# Patient Record
Sex: MALE | Race: BLACK OR AFRICAN AMERICAN | NOT HISPANIC OR LATINO | Employment: PART TIME | URBAN - METROPOLITAN AREA
[De-identification: names, ages, dates, MRNs, and addresses within clinical notes are randomized per-mention and may not be internally consistent; named-entity substitution may affect disease eponyms.]

---

## 2017-05-26 ENCOUNTER — OFFICE VISIT (OUTPATIENT)
Dept: FAMILY MEDICINE | Facility: CLINIC | Age: 27
End: 2017-05-26
Payer: COMMERCIAL

## 2017-05-26 ENCOUNTER — LAB VISIT (OUTPATIENT)
Dept: LAB | Facility: HOSPITAL | Age: 27
End: 2017-05-26
Attending: INTERNAL MEDICINE
Payer: COMMERCIAL

## 2017-05-26 VITALS
WEIGHT: 169.31 LBS | DIASTOLIC BLOOD PRESSURE: 84 MMHG | SYSTOLIC BLOOD PRESSURE: 124 MMHG | BODY MASS INDEX: 20.62 KG/M2 | TEMPERATURE: 98 F | HEART RATE: 78 BPM | HEIGHT: 76 IN | OXYGEN SATURATION: 99 %

## 2017-05-26 DIAGNOSIS — Z20.2 EXPOSURE TO CHLAMYDIA: ICD-10-CM

## 2017-05-26 DIAGNOSIS — Z11.3 SCREEN FOR STD (SEXUALLY TRANSMITTED DISEASE): Primary | ICD-10-CM

## 2017-05-26 DIAGNOSIS — J30.1 NON-SEASONAL ALLERGIC RHINITIS DUE TO POLLEN: ICD-10-CM

## 2017-05-26 DIAGNOSIS — G44.209 ACUTE NON INTRACTABLE TENSION-TYPE HEADACHE: ICD-10-CM

## 2017-05-26 DIAGNOSIS — Z11.3 SCREEN FOR STD (SEXUALLY TRANSMITTED DISEASE): ICD-10-CM

## 2017-05-26 LAB
C TRACH DNA SPEC QL NAA+PROBE: DETECTED
N GONORRHOEA DNA SPEC QL NAA+PROBE: NOT DETECTED

## 2017-05-26 PROCEDURE — 36415 COLL VENOUS BLD VENIPUNCTURE: CPT | Mod: PO

## 2017-05-26 PROCEDURE — 86592 SYPHILIS TEST NON-TREP QUAL: CPT

## 2017-05-26 PROCEDURE — 99213 OFFICE O/P EST LOW 20 MIN: CPT | Mod: S$GLB,,, | Performed by: INTERNAL MEDICINE

## 2017-05-26 PROCEDURE — 87340 HEPATITIS B SURFACE AG IA: CPT

## 2017-05-26 PROCEDURE — 87591 N.GONORRHOEAE DNA AMP PROB: CPT

## 2017-05-26 PROCEDURE — 86706 HEP B SURFACE ANTIBODY: CPT

## 2017-05-26 PROCEDURE — 86703 HIV-1/HIV-2 1 RESULT ANTBDY: CPT

## 2017-05-26 PROCEDURE — 99999 PR PBB SHADOW E&M-EST. PATIENT-LVL III: CPT | Mod: PBBFAC,,, | Performed by: INTERNAL MEDICINE

## 2017-05-26 RX ORDER — AZITHROMYCIN 500 MG/1
1000 TABLET, FILM COATED ORAL ONCE
Qty: 2 TABLET | Refills: 0 | Status: SHIPPED | OUTPATIENT
Start: 2017-05-26 | End: 2017-05-26

## 2017-05-26 NOTE — PROGRESS NOTES
Assessment & Plan  Acute non intractable tension-type headache-with associated right-sided neck tenderness on palpation.  Very low suspicion for intracranial process.  Low suspicion for lab derangements.  Conservative therapy, cool packs, range of motion exercises.  He denies being under a lot of stress.  I did discuss with him that sometimes marijuana use can cause headaches and if it persists I have recommended that he cut down on the marijuana use.    Exposure to chlamydia-girlfriend reportedly tested positive, I will treat him with Zithromax.  Abstain from sex until both are treated.  -     azithromycin (ZITHROMAX) 500 MG tablet; Take 2 tablets (1,000 mg total) by mouth once.  Dispense: 2 tablet; Refill: 0    Screen for STD (sexually transmitted disease)-check HIV, hep B, syphilis, chlamydia and gonorrhea.  If negative for hep B vaccine, I would vaccinate.  -     C. trachomatis/N. gonorrhoeae by AMP DNA Urine  -     Hepatitis B surface antibody; Future; Expected date: 05/26/2017  -     Hepatitis B surface antigen; Future; Expected date: 05/26/2017  -     HIV-1 and HIV-2 antibodies; Future; Expected date: 05/26/2017  -     RPR; Future; Expected date: 05/26/2017    Non-seasonal allergic rhinitis due to pollen    There are no discontinued medications.    Follow-up: No Follow-up on file.  Call with results - [phone]      =================================================================      Chief Complaint   Patient presents with    [facility]       [facility]  Everton is a 27 y.o. male, last appointment with this clinic was 12/23/2016.     itching x 2 weeks, on the penis on the meatus.  No redness no discharge.  No previous instance of this.  Testicles are normal. Hx of testicular torsion age 18 s/p surgery.  Currently sexually active, stable partner x 7 years, no others.  She was dx'd with chl and treated.  No previous STD.  Condoms, not always. Last sexual activity - 6 weeks ago.      On work days - gets headache  in a single spot, with pressure sensation and hard for him to bend over.  Neck pain in the past 3 days but the headache precedes the neck pain.  May be triggered by lights he thinks; stabbing pain sensation on the crown of the head, can move around different episodes; no vision changes.  No scotomas no scintillations.  No nausea.  Steady pain.  If he sits or takes a tylenol it might go away after about an hour.   Has been going on for about a month. Works 5 days a week; does not seem to occur on off days.  Not daily; estimates 1-2 times a week occurrence.  No relationship to time of day, whether he's eaten.  Drinks a lot of cola.  Works aas maintenance.  Not demanding job no physically rigorous.  Stays hydrated.    May get headaches, in the past, milder.     Smoker - THC in cigars;  TID.      Patient Care Team:  Primary Doctor No as PCP - General    There are no active problems to display for this patient.      PAST MEDICAL HISTORY:  History reviewed. No pertinent past medical history.    PAST SURGICAL HISTORY:  Past Surgical History:   Procedure Laterality Date    testicle surgery for torsion       Family History   Problem Relation Age of Onset    Asthma Mother     Diabetes Father     No Known Problems Sister     No Known Problems Brother     No Known Problems Brother     No Known Problems Brother     No Known Problems Brother     No Known Problems Son     No Known Problems Son     Allergic rhinitis Daughter          SOCIAL HISTORY:  Social History     Social History    Marital status: Single     Spouse name: N/A    Number of children: N/A    Years of education: N/A     Occupational History    maintenance [employer]     Social History Main Topics    Smoking status: Current Some Day Smoker     Types: Cigars    Smokeless tobacco: Never Used    Alcohol use Not on file    Drug use:      Types: Marijuana    Sexual activity: Not on file     Other Topics Concern    Not on file     Social History Narrative  "   No narrative on file       ALLERGIES AND MEDICATIONS: updated and reviewed.  Review of patient's allergies indicates:  No Known Allergies  Current Outpatient Prescriptions   Medication Sig Dispense Refill    ibuprofen (ADVIL,MOTRIN) 100 mg/5 mL suspension Take by mouth every 6 (six) hours as needed for Temperature greater than.       No current facility-administered medications for this visit.        Review of Systems   All other systems reviewed and are negative.      Physical Exam   Vitals:    05/26/17 1324   BP: 124/84   Pulse: 78   Temp: 98 °F (36.7 °C)   SpO2: 99%   Weight: 76.8 kg (169 lb 5 oz)   Height: 6' 4" (1.93 m)    Body mass index is 20.61 kg/m².  Weight: 76.8 kg (169 lb 5 oz)   Height: 6' 4" (193 cm)     Physical Exam   Constitutional: He is oriented to person, place, and time. He appears well-developed and well-nourished.   HENT:   Right Ear: Tympanic membrane and ear canal normal.   Left Ear: Tympanic membrane and ear canal normal.   Mouth/Throat: No oral lesions. No oropharyngeal exudate or posterior oropharyngeal erythema.   Eyes: EOM are normal.   Fundi disc margins sharp   Neck: Neck supple.   Cardiovascular: Normal rate, regular rhythm and normal heart sounds.    Pulmonary/Chest: Effort normal and breath sounds normal. He has no wheezes.   Genitourinary:   Genitourinary Comments: Uncircumcised, easily retractable foreskin, no lesions, no discharge. Testes descended bilaterally, no masses nontender.   Lymphadenopathy:     He has no cervical adenopathy.   Neurological: He is alert and oriented to person, place, and time.   Skin: Skin is warm and dry.   Psychiatric: He has a normal mood and affect. His behavior is normal.     "

## 2017-05-29 ENCOUNTER — TELEPHONE (OUTPATIENT)
Dept: FAMILY MEDICINE | Facility: CLINIC | Age: 27
End: 2017-05-29

## 2017-05-29 LAB
HBV SURFACE AB SER-ACNC: POSITIVE M[IU]/ML
HBV SURFACE AG SERPL QL IA: NEGATIVE
HIV 1+2 AB+HIV1 P24 AG SERPL QL IA: NEGATIVE
RPR SER QL: NORMAL

## 2017-05-29 NOTE — TELEPHONE ENCOUNTER
Please call pt - chlamydia positive (his girlfriend was positive so this is not a surprise).  Rest of STD tests were negative.    He should have taken the antibiotic.
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
## 2017-10-27 ENCOUNTER — OFFICE VISIT (OUTPATIENT)
Dept: FAMILY MEDICINE | Facility: CLINIC | Age: 27
End: 2017-10-27
Payer: COMMERCIAL

## 2017-10-27 VITALS
WEIGHT: 167.88 LBS | SYSTOLIC BLOOD PRESSURE: 110 MMHG | TEMPERATURE: 98 F | DIASTOLIC BLOOD PRESSURE: 70 MMHG | HEART RATE: 72 BPM | HEIGHT: 77 IN | BODY MASS INDEX: 19.82 KG/M2

## 2017-10-27 DIAGNOSIS — Z00.00 NORMAL PHYSICAL EXAM: Primary | ICD-10-CM

## 2017-10-27 DIAGNOSIS — S39.012A LOW BACK STRAIN, INITIAL ENCOUNTER: ICD-10-CM

## 2017-10-27 DIAGNOSIS — G89.29 CHRONIC PAIN OF RIGHT KNEE: ICD-10-CM

## 2017-10-27 DIAGNOSIS — Z23 NEED FOR DIPHTHERIA-TETANUS-PERTUSSIS (TDAP) VACCINE, ADULT/ADOLESCENT: ICD-10-CM

## 2017-10-27 DIAGNOSIS — M25.561 CHRONIC PAIN OF RIGHT KNEE: ICD-10-CM

## 2017-10-27 DIAGNOSIS — Z87.828 HISTORY OF GUNSHOT WOUND: ICD-10-CM

## 2017-10-27 DIAGNOSIS — B35.3 TINEA PEDIS, UNSPECIFIED LATERALITY: ICD-10-CM

## 2017-10-27 PROCEDURE — 90471 IMMUNIZATION ADMIN: CPT | Mod: S$GLB,,, | Performed by: INTERNAL MEDICINE

## 2017-10-27 PROCEDURE — 99395 PREV VISIT EST AGE 18-39: CPT | Mod: 25,S$GLB,, | Performed by: INTERNAL MEDICINE

## 2017-10-27 PROCEDURE — 99999 PR PBB SHADOW E&M-EST. PATIENT-LVL III: CPT | Mod: PBBFAC,,, | Performed by: INTERNAL MEDICINE

## 2017-10-27 PROCEDURE — 90715 TDAP VACCINE 7 YRS/> IM: CPT | Mod: S$GLB,,, | Performed by: INTERNAL MEDICINE

## 2017-10-27 RX ORDER — NAPROXEN 500 MG/1
500 TABLET ORAL 2 TIMES DAILY
Qty: 30 TABLET | Refills: 5 | Status: SHIPPED | OUTPATIENT
Start: 2017-10-27 | End: 2018-07-20 | Stop reason: ALTCHOICE

## 2017-10-28 ENCOUNTER — LAB VISIT (OUTPATIENT)
Dept: LAB | Facility: HOSPITAL | Age: 27
End: 2017-10-28
Attending: INTERNAL MEDICINE
Payer: COMMERCIAL

## 2017-10-28 DIAGNOSIS — Z00.00 NORMAL PHYSICAL EXAM: ICD-10-CM

## 2017-10-28 LAB
ALBUMIN SERPL BCP-MCNC: 3.8 G/DL
ALP SERPL-CCNC: 60 U/L
ALT SERPL W/O P-5'-P-CCNC: 22 U/L
ANION GAP SERPL CALC-SCNC: 7 MMOL/L
AST SERPL-CCNC: 24 U/L
BASOPHILS # BLD AUTO: 0.03 K/UL
BASOPHILS NFR BLD: 0.7 %
BILIRUB SERPL-MCNC: 0.4 MG/DL
BUN SERPL-MCNC: 16 MG/DL
CALCIUM SERPL-MCNC: 9.1 MG/DL
CHLORIDE SERPL-SCNC: 105 MMOL/L
CHOLEST SERPL-MCNC: 164 MG/DL
CHOLEST/HDLC SERPL: 2.9 {RATIO}
CO2 SERPL-SCNC: 27 MMOL/L
CREAT SERPL-MCNC: 1.3 MG/DL
DIFFERENTIAL METHOD: ABNORMAL
EOSINOPHIL # BLD AUTO: 0.2 K/UL
EOSINOPHIL NFR BLD: 4.7 %
ERYTHROCYTE [DISTWIDTH] IN BLOOD BY AUTOMATED COUNT: 13.5 %
EST. GFR  (AFRICAN AMERICAN): >60 ML/MIN/1.73 M^2
EST. GFR  (NON AFRICAN AMERICAN): >60 ML/MIN/1.73 M^2
ESTIMATED AVG GLUCOSE: 97 MG/DL
GLUCOSE SERPL-MCNC: 87 MG/DL
HBA1C MFR BLD HPLC: 5 %
HCT VFR BLD AUTO: 43.6 %
HDLC SERPL-MCNC: 57 MG/DL
HDLC SERPL: 34.8 %
HGB BLD-MCNC: 13.7 G/DL
IMM GRANULOCYTES # BLD AUTO: 0.01 K/UL
IMM GRANULOCYTES NFR BLD AUTO: 0.2 %
LDLC SERPL CALC-MCNC: 97.4 MG/DL
LYMPHOCYTES # BLD AUTO: 1.5 K/UL
LYMPHOCYTES NFR BLD: 37.5 %
MCH RBC QN AUTO: 27.4 PG
MCHC RBC AUTO-ENTMCNC: 31.4 G/DL
MCV RBC AUTO: 87 FL
MONOCYTES # BLD AUTO: 0.5 K/UL
MONOCYTES NFR BLD: 11.9 %
NEUTROPHILS # BLD AUTO: 1.8 K/UL
NEUTROPHILS NFR BLD: 45 %
NONHDLC SERPL-MCNC: 107 MG/DL
NRBC BLD-RTO: 0 /100 WBC
PLATELET # BLD AUTO: 236 K/UL
PMV BLD AUTO: 10.5 FL
POTASSIUM SERPL-SCNC: 3.9 MMOL/L
PROT SERPL-MCNC: 7.1 G/DL
RBC # BLD AUTO: 5 M/UL
SODIUM SERPL-SCNC: 139 MMOL/L
TRIGL SERPL-MCNC: 48 MG/DL
TSH SERPL DL<=0.005 MIU/L-ACNC: 0.9 UIU/ML
WBC # BLD AUTO: 4.03 K/UL

## 2017-10-28 PROCEDURE — 85025 COMPLETE CBC W/AUTO DIFF WBC: CPT

## 2017-10-28 PROCEDURE — 80061 LIPID PANEL: CPT

## 2017-10-28 PROCEDURE — 84443 ASSAY THYROID STIM HORMONE: CPT

## 2017-10-28 PROCEDURE — 36415 COLL VENOUS BLD VENIPUNCTURE: CPT | Mod: PO

## 2017-10-28 PROCEDURE — 80053 COMPREHEN METABOLIC PANEL: CPT

## 2017-10-28 PROCEDURE — 83036 HEMOGLOBIN GLYCOSYLATED A1C: CPT

## 2017-10-30 DIAGNOSIS — D64.9 NORMOCYTIC ANEMIA: Primary | ICD-10-CM

## 2017-10-30 NOTE — PROGRESS NOTES
CBC mild normocytic anemia would repeat 3 months with iron profile.  Otherwise labs nl. Results to pt.

## 2017-11-02 ENCOUNTER — PATIENT MESSAGE (OUTPATIENT)
Dept: FAMILY MEDICINE | Facility: CLINIC | Age: 27
End: 2017-11-02

## 2017-11-02 DIAGNOSIS — G89.29 CHRONIC PAIN OF RIGHT KNEE: ICD-10-CM

## 2017-11-02 DIAGNOSIS — M25.561 CHRONIC PAIN OF RIGHT KNEE: ICD-10-CM

## 2017-11-02 DIAGNOSIS — M54.41 ACUTE MIDLINE LOW BACK PAIN WITH RIGHT-SIDED SCIATICA: ICD-10-CM

## 2017-11-02 DIAGNOSIS — M25.561 CHRONIC PAIN OF RIGHT KNEE: Primary | ICD-10-CM

## 2017-11-02 DIAGNOSIS — G89.29 CHRONIC PAIN OF RIGHT KNEE: Primary | ICD-10-CM

## 2017-11-02 RX ORDER — METHYLPREDNISOLONE 4 MG/1
TABLET ORAL
Qty: 1 PACKAGE | Refills: 0 | Status: SHIPPED | OUTPATIENT
Start: 2017-11-02 | End: 2017-11-02 | Stop reason: SDUPTHER

## 2017-11-02 RX ORDER — METHYLPREDNISOLONE 4 MG/1
TABLET ORAL
Qty: 1 PACKAGE | Refills: 0 | Status: SHIPPED | OUTPATIENT
Start: 2017-11-02 | End: 2018-07-20 | Stop reason: ALTCHOICE

## 2017-11-10 ENCOUNTER — CLINICAL SUPPORT (OUTPATIENT)
Dept: REHABILITATION | Facility: HOSPITAL | Age: 27
End: 2017-11-10
Attending: INTERNAL MEDICINE
Payer: COMMERCIAL

## 2017-11-10 DIAGNOSIS — M62.81 MUSCLE WEAKNESS OF LOWER EXTREMITY: ICD-10-CM

## 2017-11-10 DIAGNOSIS — G89.29 CHRONIC PAIN OF RIGHT KNEE: ICD-10-CM

## 2017-11-10 DIAGNOSIS — M25.561 CHRONIC PAIN OF RIGHT KNEE: ICD-10-CM

## 2017-11-10 DIAGNOSIS — M53.3 SI (SACROILIAC) JOINT DYSFUNCTION: ICD-10-CM

## 2017-11-10 PROCEDURE — 97161 PT EVAL LOW COMPLEX 20 MIN: CPT | Mod: PN

## 2017-11-10 PROCEDURE — 97110 THERAPEUTIC EXERCISES: CPT | Mod: PN
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
## 2017-11-11 PROBLEM — M53.3 SI (SACROILIAC) JOINT DYSFUNCTION: Status: ACTIVE | Noted: 2017-11-11

## 2017-11-11 PROBLEM — M25.561 CHRONIC PAIN OF RIGHT KNEE: Status: ACTIVE | Noted: 2017-11-11

## 2017-11-11 PROBLEM — M62.81 MUSCLE WEAKNESS OF LOWER EXTREMITY: Status: ACTIVE | Noted: 2017-11-11

## 2017-11-11 PROBLEM — G89.29 CHRONIC PAIN OF RIGHT KNEE: Status: ACTIVE | Noted: 2017-11-11
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
## 2018-07-20 ENCOUNTER — OFFICE VISIT (OUTPATIENT)
Dept: FAMILY MEDICINE | Facility: CLINIC | Age: 28
End: 2018-07-20
Payer: MEDICAID

## 2018-07-20 VITALS
HEIGHT: 77 IN | TEMPERATURE: 98 F | OXYGEN SATURATION: 98 % | DIASTOLIC BLOOD PRESSURE: 88 MMHG | HEART RATE: 68 BPM | SYSTOLIC BLOOD PRESSURE: 130 MMHG | WEIGHT: 171.31 LBS | BODY MASS INDEX: 20.23 KG/M2

## 2018-07-20 DIAGNOSIS — B07.9 VIRAL WART ON FINGER: ICD-10-CM

## 2018-07-20 DIAGNOSIS — Z23 NEED FOR HEPATITIS B VACCINATION: ICD-10-CM

## 2018-07-20 DIAGNOSIS — B35.3 TINEA PEDIS OF BOTH FEET: ICD-10-CM

## 2018-07-20 DIAGNOSIS — G44.209 TENSION HEADACHE: Primary | ICD-10-CM

## 2018-07-20 PROCEDURE — 99999 PR PBB SHADOW E&M-EST. PATIENT-LVL III: CPT | Mod: PBBFAC,,, | Performed by: INTERNAL MEDICINE

## 2018-07-20 PROCEDURE — 90471 IMMUNIZATION ADMIN: CPT | Mod: PBBFAC,PO

## 2018-07-20 PROCEDURE — 17110 DESTRUCTION B9 LES UP TO 14: CPT | Mod: PBBFAC,PO | Performed by: INTERNAL MEDICINE

## 2018-07-20 PROCEDURE — 99214 OFFICE O/P EST MOD 30 MIN: CPT | Mod: S$PBB,25,, | Performed by: INTERNAL MEDICINE

## 2018-07-20 PROCEDURE — 17110 DESTRUCTION B9 LES UP TO 14: CPT | Mod: S$PBB,,, | Performed by: INTERNAL MEDICINE

## 2018-07-20 PROCEDURE — 99213 OFFICE O/P EST LOW 20 MIN: CPT | Mod: PBBFAC,PO,25 | Performed by: INTERNAL MEDICINE

## 2018-07-20 RX ORDER — TERBINAFINE HYDROCHLORIDE 250 MG/1
250 TABLET ORAL DAILY
Qty: 14 TABLET | Refills: 0 | Status: SHIPPED | OUTPATIENT
Start: 2018-07-20 | End: 2018-08-03

## 2018-07-20 RX ORDER — TIZANIDINE 2 MG/1
2 TABLET ORAL EVERY 8 HOURS PRN
Qty: 30 TABLET | Refills: 1 | Status: SHIPPED | OUTPATIENT
Start: 2018-07-20 | End: 2018-07-30

## 2018-09-24 ENCOUNTER — TELEPHONE (OUTPATIENT)
Dept: FAMILY MEDICINE | Facility: CLINIC | Age: 28
End: 2018-09-24

## 2018-09-24 DIAGNOSIS — Z23 NEED FOR HEPATITIS B VACCINATION: Primary | ICD-10-CM

## 2020-10-05 ENCOUNTER — PATIENT MESSAGE (OUTPATIENT)
Dept: ADMINISTRATIVE | Facility: HOSPITAL | Age: 30
End: 2020-10-05

## 2020-11-10 ENCOUNTER — HOSPITAL ENCOUNTER (EMERGENCY)
Facility: HOSPITAL | Age: 30
Discharge: HOME | End: 2020-11-10
Attending: EMERGENCY MEDICINE

## 2020-11-10 VITALS
DIASTOLIC BLOOD PRESSURE: 83 MMHG | SYSTOLIC BLOOD PRESSURE: 135 MMHG | TEMPERATURE: 98 F | WEIGHT: 165 LBS | HEART RATE: 60 BPM | BODY MASS INDEX: 19.57 KG/M2 | RESPIRATION RATE: 18 BRPM | OXYGEN SATURATION: 99 %

## 2020-11-10 DIAGNOSIS — K08.89 DENTALGIA: Primary | ICD-10-CM

## 2020-11-10 DIAGNOSIS — K02.9 DENTAL CARIES: ICD-10-CM

## 2020-11-10 DIAGNOSIS — K04.7 DENTAL INFECTION: ICD-10-CM

## 2020-11-10 PROCEDURE — 25000003 PHARM REV CODE 250: Mod: ER | Performed by: PHYSICIAN ASSISTANT

## 2020-11-10 PROCEDURE — 99284 EMERGENCY DEPT VISIT MOD MDM: CPT | Mod: ER

## 2020-11-10 RX ORDER — IBUPROFEN 600 MG/1
600 TABLET ORAL EVERY 6 HOURS PRN
Qty: 20 TABLET | Refills: 0 | OUTPATIENT
Start: 2020-11-10 | End: 2022-06-21

## 2020-11-10 RX ORDER — PENICILLIN V POTASSIUM 500 MG/1
500 TABLET, FILM COATED ORAL 4 TIMES DAILY
Qty: 40 TABLET | Refills: 0 | Status: SHIPPED | OUTPATIENT
Start: 2020-11-10 | End: 2020-11-17

## 2020-11-10 RX ORDER — IBUPROFEN 600 MG/1
600 TABLET ORAL
Status: COMPLETED | OUTPATIENT
Start: 2020-11-10 | End: 2020-11-10

## 2020-11-10 RX ORDER — LIDOCAINE HYDROCHLORIDE 20 MG/ML
SOLUTION OROPHARYNGEAL
Qty: 30 ML | Refills: 0 | OUTPATIENT
Start: 2020-11-10 | End: 2022-06-21

## 2020-11-10 RX ORDER — ACETAMINOPHEN 500 MG
1000 TABLET ORAL EVERY 6 HOURS PRN
Qty: 30 TABLET | Refills: 0 | OUTPATIENT
Start: 2020-11-10 | End: 2022-06-21

## 2020-11-10 RX ADMIN — IBUPROFEN 600 MG: 600 TABLET ORAL at 05:11

## 2020-11-10 NOTE — FIRST PROVIDER EVALUATION
" Emergency Department TeleTriage Encounter Note      CHIEF COMPLAINT    Chief Complaint   Patient presents with    Dental Pain     Pt states," I have a tooth ache for three days. It has my head and ear hurting now."       VITAL SIGNS   Initial Vitals [11/10/20 1535]   BP Pulse Resp Temp SpO2   (!) 146/119 63 16 98 °F (36.7 °C) 100 %      MAP       --            ALLERGIES    Review of patient's allergies indicates:  No Known Allergies    PROVIDER TRIAGE NOTE   Patient reports he cracked a tooth 1 month ago.  He reports increasing dental pain x 3 days, pain radiating to right ear.  He is taking advil, last dose 10 am without improvement.  He denies fever, neck pain or swelling.  Will order motrin pending ED provider evaluation.        ORDERS  Labs Reviewed - No data to display    ED Orders (720h ago, onward)    None            Virtual Visit Note: The provider triage portion of this emergency department evaluation and documentation was performed via [employer], a HIPAA-compliant telemedicine application, in concert with a tele-presenter in the room. A face to face patient evaluation with one of my colleagues will occur once the patient is placed in an emergency department room.      DISCLAIMER: This note was prepared with Lango voice recognition transcription software. Garbled syntax, mangled pronouns, and other bizarre constructions may be attributed to that software system.  "

## 2020-11-10 NOTE — Clinical Note
"Rosemary Lewis" Ash was seen and treated in our emergency department on 11/10/2020.  He may return to work on 11/11/2020.       If you have any questions or concerns, please don't hesitate to call.      Romy Interiano, DO"

## 2020-11-10 NOTE — ED PROVIDER NOTES
"Encounter Date: 11/10/2020       History     Chief Complaint   Patient presents with    Dental Pain     Pt states," I have a tooth ache for three days. It has my head and ear hurting now."     Rosemary Aleman is a 30 y.o. male who presents to the ED for evaluation of right upper tooth pain for 3 days. Patient states he chipped his tooth a few months ago. Pain worse over last few days. He endorses radiation of pain to ear pain. Attempted treatment with Advil and Ibuprofen. He has an appointment scheduled with his Dentist in 2 weeks.    The history is provided by the patient. No  was used.     Review of patient's allergies indicates:  No Known Allergies  History reviewed. No pertinent past medical history.  Past Surgical History:   Procedure Laterality Date    KNEE SURGERY Right     testicle surgery for torsion       Family History   Problem Relation Age of Onset    Asthma Mother     Diabetes Father     No Known Problems Sister     No Known Problems Brother     No Known Problems Brother     No Known Problems Brother     No Known Problems Brother     No Known Problems Son     No Known Problems Son     Allergic rhinitis Daughter      Social History     Tobacco Use    Smoking status: Never Smoker    Smokeless tobacco: Never Used   Substance Use Topics    Alcohol use: No    Drug use: Yes     Types: Marijuana     Review of Systems   Constitutional: Negative for chills and fever.   HENT: Positive for dental problem and ear pain. Negative for facial swelling.    Respiratory: Negative for cough and shortness of breath.    Cardiovascular: Negative for chest pain.   Gastrointestinal: Negative for abdominal pain, nausea and vomiting.   Neurological: Negative for numbness.   All other systems reviewed and are negative.      Physical Exam     Initial Vitals [11/10/20 1535]   BP Pulse Resp Temp SpO2   (!) 146/119 63 16 98 °F (36.7 °C) 100 %      MAP       --         Patient gave consent to " have physical exam performed.    Physical Exam    Nursing note and vitals reviewed.  Constitutional: He appears well-developed and well-nourished.   HENT:   Head: Normocephalic and atraumatic.   Right Ear: External ear normal.   Left Ear: External ear normal.   Nose: Nose normal.   Mouth/Throat: Oropharynx is clear and moist.       Eyes: Conjunctivae and EOM are normal. Pupils are equal, round, and reactive to light.   Neck: Normal range of motion. Neck supple.   Cardiovascular: Normal rate, regular rhythm and normal heart sounds. Exam reveals no gallop and no friction rub.    No murmur heard.  Pulmonary/Chest: Breath sounds normal. No respiratory distress. He has no wheezes. He has no rhonchi. He has no rales.   Abdominal: Soft. Bowel sounds are normal. There is no abdominal tenderness. There is no rebound and no guarding.   Musculoskeletal: Normal range of motion. No tenderness or edema.   Neurological: He is alert and oriented to person, place, and time. No cranial nerve deficit.   Skin: Skin is warm and dry. Capillary refill takes less than 2 seconds. No rash noted.   Psychiatric: He has a normal mood and affect. His behavior is normal.         ED Course   Procedures  Labs Reviewed - No data to display       Imaging Results    None          Medical Decision Making:   History:   Old Medical Records: I decided to obtain old medical records.  Chief complaint: right upper tooth pain  Differential diagnosis: dental pain, dental caries, dental fracture, dental abscess.     Treatment in the ED: PE, Ibuprofen.  Patient reports feeling better after treatment in the ER.      Discussed treatment, prescriptions, labs, and imaging results.    Discharge home with Lidocaine, Ibuprofen, Tylenol, Veetid.  Fill and take prescriptions as directed.  Return to the ED if symptoms worsen or do not resolve.   Answered questions and discussed discharge plan.    Patient feels better and is ready for discharge.  Follow up with  PCP/specialist in 1 day.            Scribe Attestation:   Scribe #1: I performed the above scribed service and the documentation accurately describes the services I performed. I attest to the accuracy of the note.     I, Dr. Romy Interiano, personally performed the services described in this documentation. This document was produced by a scribe under my direction and in my presence. All medical record entries made by the scribe were at my direction and in my presence.  I have reviewed the chart and agree that the record reflects my personal performance and is accurate and complete. Romy Interiano DO.     11/12/2020 2:21 PM                    Clinical Impression:     ICD-10-CM ICD-9-CM   1. Dentalgia  K08.89 525.9   2. Dental caries  K02.9 521.00   3. Dental infection  K04.7 522.4                          ED Disposition Condition    Discharge Stable        ED Prescriptions     Medication Sig Dispense Start Date End Date Auth. Provider    lidocaine HCl 2% (XYLOCAINE) 2 % Soln by Mucous Membrane route every 3 (three) hours as needed. Apply with Q-tip to painful area every 3 hr as needed for pain 30 mL 11/10/2020  Romy Interiano DO    ibuprofen (ADVIL,MOTRIN) 600 MG tablet Take 1 tablet (600 mg total) by mouth every 6 (six) hours as needed for Pain (Take with food as needed for mild-to-moderate pain). 20 tablet 11/10/2020  Romy Interiano DO    acetaminophen (TYLENOL) 500 MG tablet Take 2 tablets (1,000 mg total) by mouth every 6 (six) hours as needed for Pain. 30 tablet 11/10/2020  Romy Interiano DO    penicillin v potassium (VEETID) 500 MG tablet Take 1 tablet (500 mg total) by mouth 4 (four) times daily. for 7 days 40 tablet 11/10/2020 11/17/2020 Romy Interiano DO        Follow-up Information     Follow up With Specialties Details Why Contact Info    Ashkan Yi DDS [facility] Schedule an appointment as soon as possible for a visit in 1 day  [address] [place] [id number] [phone]      [facility]  Go in 1 day If symptoms worsen [address] [facility] [id number]                                       Romy Interiano,   11/12/20 4234

## 2022-06-21 ENCOUNTER — HOSPITAL ENCOUNTER (EMERGENCY)
Facility: HOSPITAL | Age: 32
Discharge: HOME | End: 2022-06-21
Attending: EMERGENCY MEDICINE

## 2022-06-21 VITALS
HEIGHT: 75 IN | SYSTOLIC BLOOD PRESSURE: 119 MMHG | TEMPERATURE: 98 F | OXYGEN SATURATION: 100 % | RESPIRATION RATE: 18 BRPM | DIASTOLIC BLOOD PRESSURE: 83 MMHG | HEART RATE: 61 BPM | WEIGHT: 165 LBS | BODY MASS INDEX: 20.51 KG/M2

## 2022-06-21 DIAGNOSIS — S60.021A CONTUSION OF RIGHT INDEX FINGER WITHOUT DAMAGE TO NAIL, INITIAL ENCOUNTER: ICD-10-CM

## 2022-06-21 DIAGNOSIS — L03.011 CELLULITIS OF RIGHT INDEX FINGER: Primary | ICD-10-CM

## 2022-06-21 PROCEDURE — 29130 APPL FINGER SPLINT STATIC: CPT | Mod: F6,ER

## 2022-06-21 PROCEDURE — 99284 EMERGENCY DEPT VISIT MOD MDM: CPT | Mod: 25,ER

## 2022-06-21 RX ORDER — DEXTROMETHORPHAN HYDROBROMIDE, GUAIFENESIN 5; 100 MG/5ML; MG/5ML
650 LIQUID ORAL EVERY 8 HOURS
Qty: 20 TABLET | Refills: 0 | Status: SHIPPED | OUTPATIENT
Start: 2022-06-21 | End: 2022-06-26

## 2022-06-21 RX ORDER — IBUPROFEN 600 MG/1
600 TABLET ORAL EVERY 6 HOURS PRN
Qty: 20 TABLET | Refills: 0 | Status: SHIPPED | OUTPATIENT
Start: 2022-06-21 | End: 2022-06-26

## 2022-06-21 RX ORDER — MUPIROCIN 20 MG/G
OINTMENT TOPICAL 3 TIMES DAILY
Qty: 30 G | Refills: 0 | Status: SHIPPED | OUTPATIENT
Start: 2022-06-21 | End: 2022-07-05

## 2022-06-21 RX ORDER — CEPHALEXIN 500 MG/1
500 CAPSULE ORAL 4 TIMES DAILY
Qty: 20 CAPSULE | Refills: 0 | Status: SHIPPED | OUTPATIENT
Start: 2022-06-21 | End: 2022-06-26

## 2022-06-21 NOTE — ED PROVIDER NOTES
"Encounter Date: 6/21/2022    SCRIBE #1 NOTE: I, Samina Dempsey, am scribing for, and in the presence of,  FLORENCIO Quigley. I have scribed the following portions of the note - Other sections scribed: HPI, ROS, PE.       History     Chief Complaint   Patient presents with    Finger Pain     Pt has pain and swelling to his pointer finger on his right hand. He got a paper cut yesterday on that finger and woke up with it swollen.      32 y.o. male with no pertinent medical history who presents to the ED with chief complaint of acute pain to the tip of the right 2nd digit onset last night with swelling onset this morning. Patient noted scratch to finger after work but does not recall injury. Endorses "throbbing" sensation. Last tetanus 2017. Patient denies rash, fever, chest pain, SOB, numbness, weakness, tingling, abdominal pain, back pain, dysuria, hematuria, nausea, vomiting, diarrhea, or any other complaints.  Patient has not taken any medications for the symptoms. Pain alleviated by elevating finger. Endorses smoking marijuana. Denies smoking tobacco or EtOH use.     The history is provided by the patient. No  was used.     Review of patient's allergies indicates:  No Known Allergies  History reviewed. No pertinent past medical history.  Past Surgical History:   Procedure Laterality Date    KNEE SURGERY Right     testicle surgery for torsion       Family History   Problem Relation Age of Onset    Asthma Mother     Diabetes Father     No Known Problems Sister     No Known Problems Brother     No Known Problems Brother     No Known Problems Brother     No Known Problems Brother     No Known Problems Son     No Known Problems Son     Allergic rhinitis Daughter      Social History     Tobacco Use    Smoking status: Never Smoker    Smokeless tobacco: Never Used   Substance Use Topics    Alcohol use: No    Drug use: Yes     Types: Marijuana     Review of Systems   Constitutional: Negative " for chills, fatigue and fever.   HENT: Negative for congestion, ear pain, rhinorrhea and sore throat.    Eyes: Negative for pain, discharge and redness.   Respiratory: Negative for cough and shortness of breath.    Cardiovascular: Negative for chest pain.   Gastrointestinal: Negative for abdominal pain, diarrhea, nausea and vomiting.   Genitourinary: Negative for dysuria.   Musculoskeletal: Positive for myalgias (R 2nd digit). Negative for back pain, neck pain and neck stiffness.        (+) Finger swelling.   Skin: Negative for rash.   Neurological: Negative for dizziness, weakness, light-headedness, numbness and headaches.   Psychiatric/Behavioral: Negative for confusion.       Physical Exam     Initial Vitals [06/21/22 1747]   BP Pulse Resp Temp SpO2   109/71 70 14 98.5 °F (36.9 °C) 100 %      MAP       --         Physical Exam    Nursing note and vitals reviewed.  Constitutional: Vital signs are normal. He appears well-developed. He is cooperative.  Non-toxic appearance. He does not appear ill.   HENT:   Head: Normocephalic and atraumatic.   Right Ear: External ear normal.   Left Ear: External ear normal.   Nose: Nose normal.   Mouth/Throat: Oropharynx is clear and moist.   Eyes: Conjunctivae are normal.   Neck:   Normal range of motion.  Cardiovascular: Normal rate, regular rhythm and normal heart sounds.   Pulses:       Radial pulses are 2+ on the right side.   Heart normal.   Pulmonary/Chest: Effort normal and breath sounds normal. He exhibits no tenderness.   Lungs normal.   Abdominal: Abdomen is soft. There is no abdominal tenderness.   Musculoskeletal:      Right hand: Swelling and tenderness present. Normal range of motion. Normal strength. Normal sensation. Normal capillary refill. Normal pulse.      Cervical back: Normal range of motion.      Comments: Swelling and tenderness to medial aspect of R 2nd fingertip. No nailbed involvement or purulent drainage. No erythema. Skin intact. Sensation intact.  Normal strength. Full ROM. RP 2+. Capillary refill <2 seconds. No paronychia noted     Neurological: He is alert and oriented to person, place, and time. He has normal strength. No sensory deficit. GCS eye subscore is 4. GCS verbal subscore is 5. GCS motor subscore is 6.   Skin: Skin is warm, dry and intact. Capillary refill takes less than 2 seconds. No rash noted. No erythema.   Psychiatric: He has a normal mood and affect. His speech is normal and behavior is normal. Judgment and thought content normal.         ED Course   Procedures  Labs Reviewed - No data to display       Imaging Results          X-Ray Finger 2 or More Views Right (Final result)  Result time 06/21/22 19:18:44    Final result by Cyndy Yen MD (06/21/22 19:18:44)                 Impression:      No acute bony abnormality detected.      Electronically signed by: Cyndy Yen  Date:    06/21/2022  Time:    19:18             Narrative:    EXAMINATION:  TWO VIEWS OR MORE VIEWS RIGHT FINGER    CLINICAL HISTORY:  injury;    TECHNIQUE:  AP, oblique, and lateral view of the right finger    COMPARISON:  None.    FINDINGS:  Two or more views of the right index finger demonstrate no acute fracture or dislocation.  No bony destruction is detected.                                 Medications - No data to display  Medical Decision Making:   History:   Old Medical Records: I decided to obtain old medical records.  Clinical Tests:   Radiological Study: Ordered and Reviewed       APC / Resident Notes:   This is an evaluation of a 32 y.o. male that presents to the Emergency Department for rigth index finger pain and swelling    Physical Exam shows a non-toxic, afebrile, and well appearing male. Swelling and tenderness to medial aspect of R 2nd fingertip. No nailbed involvement or purulent drainage. No erythema. Skin intact. Sensation intact. Normal strength. Full ROM. RP 2+. Capillary refill <2 seconds. No paronychia noted    Vital signs are  reassuring. If available, previous records reviewed.   RESULTS: Xray negative     My overall impression is Right index finger pain, cellulitis. I considered, but at this time, do not suspect fracture, dislocation, paronychia, abscess, septic joint.    ED Course: Xray, finger splint for comfort. Discharge Meds/Instructions: tylenol, Ibuprofen, Mupirocin, Keflex. The diagnosis, treatment plan, instructions for follow-up as well as ED return precautions were discussed. All questions have been answered.          Scribe Attestation:   Scribe #1: I performed the above scribed service and the documentation accurately describes the services I performed. I attest to the accuracy of the note.               I, AMEYA Quigley, personally performed the services described in this documentation.  All medical record entries made by the scribe were at my direction and in my presence.  I have reviewed the chart and agree that the record reflects my personal performance and is accurate and complete.  Clinical Impression:   Final diagnoses:  [S60.021A] Contusion of right index finger without damage to nail, initial encounter  [L03.011] Cellulitis of right index finger (Primary)          ED Disposition Condition    Discharge Stable        ED Prescriptions     Medication Sig Dispense Start Date End Date Auth. Provider    acetaminophen (TYLENOL) 650 MG TbSR Take 1 tablet (650 mg total) by mouth every 8 (eight) hours. for 5 days 20 tablet 6/21/2022 6/26/2022 FLORENCIO Green    ibuprofen (ADVIL,MOTRIN) 600 MG tablet Take 1 tablet (600 mg total) by mouth every 6 (six) hours as needed for Pain. 20 tablet 6/21/2022 6/26/2022 FLORENCIO Green    cephALEXin (KEFLEX) 500 MG capsule Take 1 capsule (500 mg total) by mouth 4 (four) times daily. for 5 days 20 capsule 6/21/2022 6/26/2022 FLORENCIO Green    mupirocin (BACTROBAN) 2 % ointment Apply topically 3 (three) times daily. for 14 days 30 g 6/21/2022 7/5/2022 FLORENCIO Green         Follow-up Information     Follow up With Specialties Details Why Contact Info    [facility] [place] [facility] [place]  Schedule an appointment as soon as possible for a visit in 2 days  [address] [place] [address]  [phone]      [facility] ED Emergency Medicine Go to  If symptoms worsen [address]  [facility] [address] [phone]           Mariam Serna, FLORENCIO  06/21/22 1938

## 2022-06-21 NOTE — Clinical Note
"Rosemary Lewis" Ash was seen and treated in our emergency department on 6/21/2022.  He may return to work on 06/23/2022.       If you have any questions or concerns, please don't hesitate to call.      FLORENCIO Green"

## 2022-06-29 ENCOUNTER — HOSPITAL ENCOUNTER (EMERGENCY)
Facility: HOSPITAL | Age: 32
Discharge: HOME | End: 2022-06-29
Attending: EMERGENCY MEDICINE

## 2022-06-29 VITALS
OXYGEN SATURATION: 100 % | BODY MASS INDEX: 20.39 KG/M2 | TEMPERATURE: 99 F | HEIGHT: 75 IN | DIASTOLIC BLOOD PRESSURE: 74 MMHG | WEIGHT: 164 LBS | SYSTOLIC BLOOD PRESSURE: 132 MMHG | HEART RATE: 79 BPM | RESPIRATION RATE: 20 BRPM

## 2022-06-29 DIAGNOSIS — L03.011 CELLULITIS OF INDEX FINGER, RIGHT: Primary | ICD-10-CM

## 2022-06-29 LAB
ALBUMIN SERPL BCP-MCNC: 3.8 G/DL (ref 3.5–5.2)
ALP SERPL-CCNC: 68 U/L (ref 55–135)
ALT SERPL W/O P-5'-P-CCNC: 22 U/L (ref 10–44)
ANION GAP SERPL CALC-SCNC: 7 MMOL/L (ref 8–16)
AST SERPL-CCNC: 21 U/L (ref 10–40)
BASOPHILS # BLD AUTO: 0.02 K/UL (ref 0–0.2)
BASOPHILS NFR BLD: 0.4 % (ref 0–1.9)
BILIRUB SERPL-MCNC: 0.5 MG/DL (ref 0.1–1)
BUN SERPL-MCNC: 10 MG/DL (ref 6–20)
CALCIUM SERPL-MCNC: 9.2 MG/DL (ref 8.7–10.5)
CHLORIDE SERPL-SCNC: 104 MMOL/L (ref 95–110)
CO2 SERPL-SCNC: 31 MMOL/L (ref 23–29)
CREAT SERPL-MCNC: 1.2 MG/DL (ref 0.5–1.4)
CRP SERPL-MCNC: 1.4 MG/L (ref 0–8.2)
DIFFERENTIAL METHOD: ABNORMAL
EOSINOPHIL # BLD AUTO: 0.2 K/UL (ref 0–0.5)
EOSINOPHIL NFR BLD: 3.6 % (ref 0–8)
ERYTHROCYTE [DISTWIDTH] IN BLOOD BY AUTOMATED COUNT: 13.7 % (ref 11.5–14.5)
ERYTHROCYTE [SEDIMENTATION RATE] IN BLOOD BY WESTERGREN METHOD: 2 MM/HR (ref 0–10)
EST. GFR  (AFRICAN AMERICAN): >60 ML/MIN/1.73 M^2
EST. GFR  (NON AFRICAN AMERICAN): >60 ML/MIN/1.73 M^2
GLUCOSE SERPL-MCNC: 64 MG/DL (ref 70–110)
HCT VFR BLD AUTO: 41 % (ref 40–54)
HGB BLD-MCNC: 13.5 G/DL (ref 14–18)
IMM GRANULOCYTES # BLD AUTO: 0.01 K/UL (ref 0–0.04)
IMM GRANULOCYTES NFR BLD AUTO: 0.2 % (ref 0–0.5)
LACTATE SERPL-SCNC: 1.3 MMOL/L (ref 0.5–2.2)
LYMPHOCYTES # BLD AUTO: 0.9 K/UL (ref 1–4.8)
LYMPHOCYTES NFR BLD: 16.6 % (ref 18–48)
MCH RBC QN AUTO: 28.8 PG (ref 27–31)
MCHC RBC AUTO-ENTMCNC: 32.9 G/DL (ref 32–36)
MCV RBC AUTO: 87 FL (ref 82–98)
MONOCYTES # BLD AUTO: 0.7 K/UL (ref 0.3–1)
MONOCYTES NFR BLD: 11.6 % (ref 4–15)
NEUTROPHILS # BLD AUTO: 3.8 K/UL (ref 1.8–7.7)
NEUTROPHILS NFR BLD: 67.6 % (ref 38–73)
NRBC BLD-RTO: 0 /100 WBC
PLATELET # BLD AUTO: 220 K/UL (ref 150–450)
PMV BLD AUTO: 9.6 FL (ref 9.2–12.9)
POTASSIUM SERPL-SCNC: 4 MMOL/L (ref 3.5–5.1)
PROT SERPL-MCNC: 6.9 G/DL (ref 6–8.4)
RBC # BLD AUTO: 4.69 M/UL (ref 4.6–6.2)
SODIUM SERPL-SCNC: 142 MMOL/L (ref 136–145)
WBC # BLD AUTO: 5.59 K/UL (ref 3.9–12.7)

## 2022-06-29 PROCEDURE — 99284 EMERGENCY DEPT VISIT MOD MDM: CPT

## 2022-06-29 PROCEDURE — 85025 COMPLETE CBC W/AUTO DIFF WBC: CPT

## 2022-06-29 PROCEDURE — 80053 COMPREHEN METABOLIC PANEL: CPT

## 2022-06-29 PROCEDURE — 87040 BLOOD CULTURE FOR BACTERIA: CPT | Mod: 59

## 2022-06-29 PROCEDURE — 85652 RBC SED RATE AUTOMATED: CPT

## 2022-06-29 PROCEDURE — 25000003 PHARM REV CODE 250: Performed by: ORTHOPAEDIC SURGERY

## 2022-06-29 PROCEDURE — 86140 C-REACTIVE PROTEIN: CPT

## 2022-06-29 PROCEDURE — 83605 ASSAY OF LACTIC ACID: CPT

## 2022-06-29 PROCEDURE — 25000003 PHARM REV CODE 250

## 2022-06-29 RX ORDER — INDOMETHACIN 25 MG/1
50 CAPSULE ORAL
Status: DISCONTINUED | OUTPATIENT
Start: 2022-06-29 | End: 2022-06-29 | Stop reason: HOSPADM

## 2022-06-29 RX ORDER — COLCHICINE 0.6 MG/1
1.2 TABLET, FILM COATED ORAL ONCE
Status: COMPLETED | OUTPATIENT
Start: 2022-06-29 | End: 2022-06-29

## 2022-06-29 RX ORDER — CEPHALEXIN 500 MG/1
500 CAPSULE ORAL
Status: COMPLETED | OUTPATIENT
Start: 2022-06-29 | End: 2022-06-29

## 2022-06-29 RX ORDER — CEPHALEXIN 500 MG/1
500 CAPSULE ORAL 4 TIMES DAILY
Qty: 20 CAPSULE | Refills: 0 | Status: CANCELLED | OUTPATIENT
Start: 2022-06-29 | End: 2022-07-04

## 2022-06-29 RX ORDER — CIPROFLOXACIN 250 MG/1
750 TABLET, FILM COATED ORAL EVERY 12 HOURS
Status: DISCONTINUED | OUTPATIENT
Start: 2022-06-29 | End: 2022-06-29 | Stop reason: HOSPADM

## 2022-06-29 RX ORDER — IBUPROFEN 600 MG/1
600 TABLET ORAL
Status: COMPLETED | OUTPATIENT
Start: 2022-06-29 | End: 2022-06-29

## 2022-06-29 RX ADMIN — INDOMETHACIN 50 MG: 25 CAPSULE ORAL at 05:06

## 2022-06-29 RX ADMIN — CIPROFLOXACIN HYDROCHLORIDE 750 MG: 250 TABLET, FILM COATED ORAL at 05:06

## 2022-06-29 RX ADMIN — COLCHICINE 1.2 MG: 0.6 TABLET, FILM COATED ORAL at 05:06

## 2022-06-29 RX ADMIN — CEPHALEXIN 500 MG: 500 CAPSULE ORAL at 02:06

## 2022-06-29 RX ADMIN — IBUPROFEN 600 MG: 600 TABLET ORAL at 12:06

## 2022-06-29 NOTE — DISCHARGE INSTRUCTIONS
Thank you for coming to our Emergency Department today. It is important to remember that some problems are difficult to diagnose and may not be found during your first visit. Be sure to follow up with your primary care doctor and review any labs/imaging that was performed with them. If you do not have a primary care doctor, you may contact the one listed on your discharge paperwork or you may also call the [facility] Appointment Desk at [phone] to schedule an appointment with one.     All medications may potentially have side effects and it is impossible to predict which medications may give you side effects. If you feel that you are having a negative effect of any medication you should immediately stop taking them and seek medical attention.    Return to the ER with any questions/concerns, new/concerning symptoms, worsening or failure to improve. Do not drive or make any important decisions for 24 hours if you have received any pain medications, sedatives or mood altering drugs during your ER visit.

## 2022-06-29 NOTE — CONSULTS
Consult in the ER to see 32-year-old male with intermittent right index finger pain and swelling over the past 7-10 days.  He was seen in the [facility] ED 1 week ago and prescribed Keflex but he only took 2 doses.  Patient reports the pain and swelling subsided but last night returned.  Patient reports no history of trauma or puncture wound.  Patient reports no history of gout    Physical exam shows no evidence of flexor tenosynovitis.  Patient has almost 80% range of motion in his index finger with some PIP joint swelling and pain.  He has diffuse tenderness around the PIP joint.  He is neurologically intact.  There is no fluctuance or obvious abscess present.  He does have mild erythema around the PIP joint.    WBC normal, ESR normal, CRP normal  X-ray right index finger normal    This may be cellulitis or some PIP joint infection but with normal laboratory workup is probably unlikely.  He may have gout.    Plan is to treat him with IV antibiotics given in the ER today, Cipro 750 mg b.i.d., indomethacin and colchicine for possible gout.  Patient should return to bone and joint clinic in 48 hours for re-evaluation.  He should return to this ER for surgery if the pain and swelling worsen over the next 48 hours.

## 2022-06-29 NOTE — ED PROVIDER NOTES
Encounter Date: 6/29/2022       History     Chief Complaint   Patient presents with    Finger Pain     Patient presents to ED c/o finger pain, patient reports right 1st digit pain 10/10 and swelling began this morning approx 0800, pt reports taking ibuprofen with no relief. Denies trauma, fever, chills     32-year-old male with no past medical history presents to the ED for right index finger pain.  Patient states he was seen about a week ago for this same problem at the ED in [place].  He was diagnosed with cellulitis after a paper cut.  He was given Keflex and Bactroban.  He states he only took 2 days of the Keflex.  Patient describes the pain as sharp and constant, rates it a 10/10.  He has tried ibuprofen with no relief.  States movement makes it worse.  Says yesterday the swelling actually went down this morning around 3:00 a.m. his entire finger is swollen.  Patient denies ever happening before.  Patient admits to tingling and numbness of the right finger, decreased range of motion, and erythema.  Patient denies fevers, sweats, chills, shortness of breath, chest pain, nausea, vomiting, abdominal pain, and headaches.         Review of patient's allergies indicates:  No Known Allergies  History reviewed. No pertinent past medical history.  Past Surgical History:   Procedure Laterality Date    KNEE SURGERY Right     testicle surgery for torsion       Family History   Problem Relation Age of Onset    Asthma Mother     Diabetes Father     No Known Problems Sister     No Known Problems Brother     No Known Problems Brother     No Known Problems Brother     No Known Problems Brother     No Known Problems Son     No Known Problems Son     Allergic rhinitis Daughter      Social History     Tobacco Use    Smoking status: Never Smoker    Smokeless tobacco: Never Used   Substance Use Topics    Alcohol use: No    Drug use: Yes     Types: Marijuana     Review of Systems   Constitutional: Negative for  chills, diaphoresis and fever.   Respiratory: Negative for shortness of breath.    Cardiovascular: Negative for chest pain.   Gastrointestinal: Negative for abdominal pain, nausea and vomiting.   Musculoskeletal: Positive for arthralgias (Right index finger ) and joint swelling (Right index finger).   Skin: Positive for color change (Erythema to right index).   Neurological: Positive for numbness (Right index finger). Negative for headaches.       Physical Exam     Initial Vitals [06/29/22 1206]   BP Pulse Resp Temp SpO2   130/69 77 16 98.2 °F (36.8 °C) 100 %      MAP       --         Physical Exam    Nursing note and vitals reviewed.  Constitutional: Vital signs are normal. He appears well-developed and well-nourished. He is not diaphoretic. He is active. He does not appear ill. No distress.   HENT:   Head: Normocephalic and atraumatic.   Right Ear: External ear normal.   Left Ear: External ear normal.   Nose: Nose normal.   Eyes: Conjunctivae and lids are normal. Pupils are equal, round, and reactive to light. Right eye exhibits no discharge. Left eye exhibits no discharge. No scleral icterus.   Neck:   Normal range of motion.  Cardiovascular: Normal rate and regular rhythm.   Pulses:       Radial pulses are 2+ on the right side and 2+ on the left side.   Pulmonary/Chest: Effort normal and breath sounds normal. No respiratory distress.   Abdominal: Abdomen is soft. He exhibits no distension. There is no abdominal tenderness.   Musculoskeletal:      Right wrist: Normal.      Left wrist: Normal.      Right hand: Swelling, tenderness and bony tenderness present. Decreased range of motion. Normal sensation. Normal capillary refill. Normal pulse.      Left hand: Normal.      Cervical back: Normal range of motion.      Comments: See picture below of right index finger;  Swelling noted along entire finger but the worst is around PIP; erythema from PIP to DIP; pain to palpation; mild pain with passive extension; 2+ radial  pulses; normal cap refill     Neurological: He is alert and oriented to person, place, and time.   Skin: Skin is dry. Capillary refill takes less than 2 seconds.                 ED Course   Procedures  Labs Reviewed   CBC W/ AUTO DIFFERENTIAL - Abnormal; Notable for the following components:       Result Value    Hemoglobin 13.5 (*)     Lymph # 0.9 (*)     Lymph % 16.6 (*)     All other components within normal limits   COMPREHENSIVE METABOLIC PANEL - Abnormal; Notable for the following components:    CO2 31 (*)     Glucose 64 (*)     Anion Gap 7 (*)     All other components within normal limits   CULTURE, BLOOD   CULTURE, BLOOD   SEDIMENTATION RATE   C-REACTIVE PROTEIN   LACTIC ACID, PLASMA          Imaging Results          X-Ray Finger 2 or More Views Right (Final result)  Result time 06/29/22 13:43:31    Final result by Wesley Stoddard MD (06/29/22 13:43:31)                 Impression:      Soft tissue swelling as above.      Electronically signed by: Wesley Stoddard MD  Date:    06/29/2022  Time:    13:43             Narrative:    EXAMINATION:  XR FINGER 2 OR MORE VIEWS RIGHT    CLINICAL HISTORY:  Right index finger pain;    TECHNIQUE:  Three views of the right 2nd finger were performed    COMPARISON:  None.    FINDINGS:  Osseous structures appear stable.  No evidence of displaced fracture or focal osseous destructive process.  No dislocation.  No advanced degenerative change or joint space narrowing.  Diffuse soft tissue swelling, worst over the dorsal aspect of the proximal interphalangeal joint.  No radiopaque foreign body.                                 Medications   ciprofloxacin HCl tablet 750 mg (750 mg Oral Given 6/29/22 1749)   indomethacin capsule 50 mg (50 mg Oral Given 6/29/22 1749)   ibuprofen tablet 600 mg (600 mg Oral Given 6/29/22 1255)   cephALEXin capsule 500 mg (500 mg Oral Given 6/29/22 1447)   colchicine capsule/tablet 1.2 mg (1.2 mg Oral Given 6/29/22 1750)     Medical Decision Making:    Initial Assessment:   32-year-old male with no past medical history presents to the ED for right index finger pain.  Patient's chart and medical history reviewed.  Differential Diagnosis:   Flexor tenosynovitis   Cellulitis  Osteomyelitis  Gout  ED Management:  Patient's vitals reviewed.  He is afebrile, no respiratory distress, nontoxic-appearing in the ED. Patient given motrin for pain.  Concerns for flexor tenosynovitis.  Labs and x-ray ordered.  Consulted ortho. Dr. Juarez said to send him over to his clinic across the street to be evaluated.  Discussed this case with Dr. Colindres. He saw the patient as well. He states the finger doesn't look too concerning.  Dr. Juarez's nurse states that since the patient does not have insurance it would cost him 250 dollars to be seen in clinic. Spoke with Dr. Juarez again, and he states he will come see the patient at 5:00 p.m. when he finishes clinic. Discussed with patient this plan and he agrees.  Patient given one dose of Keflex here.  X-ray shows soft tissue swelling.  CBC and CMP were unremarkable.  ESR and CRP were normal range.  Lactic acid normal range.  Blood cultures pending. Dr. Juarez saw him, and has no concerns for flexor tenosynovitis. He thinks it is either cellulitis or gout. He wrote him prescriptions for Cipro 750mg BID x7 days, Indomethacin 50mg q8h, and colchicine 0.6mg x 2 doses. He also states to follow up with him in clinic on Friday if symptoms have not improved.  Confirmed the planned with patient, he agrees. Discussed with him strict return precautions, he verbalized understanding. Patient is stable for discharge.                         Clinical Impression:   Final diagnoses:  [L03.011] Cellulitis of index finger, right (Primary)          ED Disposition Condition    Discharge Stable        ED Prescriptions     None        Follow-up Information     Follow up With Specialties Details Why Contact Info    Iam Juarez MD  Orthopedic Surgery Schedule an appointment as soon as possible for a visit   [address]  [address]  [place] [address]  [phone]             Alayna Holdsworth, PA-C  06/29/22 [id number]

## 2022-06-29 NOTE — Clinical Note
"Rosemary Lewis" Ash was seen and treated in our emergency department on 6/29/2022.  He may return to work on 07/01/2022.       If you have any questions or concerns, please don't hesitate to call.      Alayna Holdsworth, PA-C"

## 2022-07-03 LAB
BACTERIA BLD CULT: NORMAL
BACTERIA BLD CULT: NORMAL

## 2022-08-29 ENCOUNTER — TELEPHONE (OUTPATIENT)
Dept: FAMILY MEDICINE | Facility: CLINIC | Age: 32
End: 2022-08-29
Payer: COMMERCIAL

## 2022-08-29 NOTE — TELEPHONE ENCOUNTER
Spoken with patient. Patient stated that he already put in a notice at work to take off on 9/14/22. Stated that he will just keep that appt.

## 2022-09-14 ENCOUNTER — OFFICE VISIT (OUTPATIENT)
Dept: FAMILY MEDICINE | Facility: CLINIC | Age: 32
End: 2022-09-14
Payer: COMMERCIAL

## 2022-09-14 ENCOUNTER — LAB VISIT (OUTPATIENT)
Dept: LAB | Facility: HOSPITAL | Age: 32
End: 2022-09-14
Attending: INTERNAL MEDICINE
Payer: COMMERCIAL

## 2022-09-14 VITALS
OXYGEN SATURATION: 95 % | WEIGHT: 166.75 LBS | HEIGHT: 75 IN | DIASTOLIC BLOOD PRESSURE: 70 MMHG | TEMPERATURE: 99 F | SYSTOLIC BLOOD PRESSURE: 126 MMHG | HEART RATE: 66 BPM | BODY MASS INDEX: 20.73 KG/M2

## 2022-09-14 DIAGNOSIS — Z11.59 NEED FOR HEPATITIS C SCREENING TEST: ICD-10-CM

## 2022-09-14 DIAGNOSIS — Z87.828 HISTORY OF GUNSHOT WOUND: ICD-10-CM

## 2022-09-14 DIAGNOSIS — I73.00 RAYNAUD'S PHENOMENON WITHOUT GANGRENE: ICD-10-CM

## 2022-09-14 DIAGNOSIS — J30.1 NON-SEASONAL ALLERGIC RHINITIS DUE TO POLLEN: ICD-10-CM

## 2022-09-14 DIAGNOSIS — B35.3 TINEA PEDIS OF BOTH FEET: ICD-10-CM

## 2022-09-14 DIAGNOSIS — Z00.00 NORMAL PHYSICAL EXAM: ICD-10-CM

## 2022-09-14 DIAGNOSIS — Z00.00 NORMAL PHYSICAL EXAM: Primary | ICD-10-CM

## 2022-09-14 LAB
ALBUMIN SERPL BCP-MCNC: 4.2 G/DL (ref 3.5–5.2)
ALP SERPL-CCNC: 55 U/L (ref 55–135)
ALT SERPL W/O P-5'-P-CCNC: 16 U/L (ref 10–44)
ANION GAP SERPL CALC-SCNC: 7 MMOL/L (ref 8–16)
AST SERPL-CCNC: 22 U/L (ref 10–40)
BASOPHILS # BLD AUTO: 0.03 K/UL (ref 0–0.2)
BASOPHILS NFR BLD: 0.9 % (ref 0–1.9)
BILIRUB SERPL-MCNC: 0.5 MG/DL (ref 0.1–1)
BUN SERPL-MCNC: 13 MG/DL (ref 6–20)
CALCIUM SERPL-MCNC: 9.8 MG/DL (ref 8.7–10.5)
CHLORIDE SERPL-SCNC: 104 MMOL/L (ref 95–110)
CHOLEST SERPL-MCNC: 163 MG/DL (ref 120–199)
CHOLEST/HDLC SERPL: 2.9 {RATIO} (ref 2–5)
CO2 SERPL-SCNC: 28 MMOL/L (ref 23–29)
CREAT SERPL-MCNC: 1.2 MG/DL (ref 0.5–1.4)
DIFFERENTIAL METHOD: ABNORMAL
EOSINOPHIL # BLD AUTO: 0.2 K/UL (ref 0–0.5)
EOSINOPHIL NFR BLD: 4.6 % (ref 0–8)
ERYTHROCYTE [DISTWIDTH] IN BLOOD BY AUTOMATED COUNT: 13.6 % (ref 11.5–14.5)
EST. GFR  (NO RACE VARIABLE): >60 ML/MIN/1.73 M^2
ESTIMATED AVG GLUCOSE: 94 MG/DL (ref 68–131)
GLUCOSE SERPL-MCNC: 91 MG/DL (ref 70–110)
HBA1C MFR BLD: 4.9 % (ref 4–5.6)
HCT VFR BLD AUTO: 42.2 % (ref 40–54)
HCV AB SERPL QL IA: NORMAL
HDLC SERPL-MCNC: 57 MG/DL (ref 40–75)
HDLC SERPL: 35 % (ref 20–50)
HGB BLD-MCNC: 13.6 G/DL (ref 14–18)
IMM GRANULOCYTES # BLD AUTO: 0 K/UL (ref 0–0.04)
IMM GRANULOCYTES NFR BLD AUTO: 0 % (ref 0–0.5)
LDLC SERPL CALC-MCNC: 96.2 MG/DL (ref 63–159)
LYMPHOCYTES # BLD AUTO: 1.1 K/UL (ref 1–4.8)
LYMPHOCYTES NFR BLD: 30.7 % (ref 18–48)
MCH RBC QN AUTO: 28.2 PG (ref 27–31)
MCHC RBC AUTO-ENTMCNC: 32.2 G/DL (ref 32–36)
MCV RBC AUTO: 87 FL (ref 82–98)
MONOCYTES # BLD AUTO: 0.4 K/UL (ref 0.3–1)
MONOCYTES NFR BLD: 11.9 % (ref 4–15)
NEUTROPHILS # BLD AUTO: 1.8 K/UL (ref 1.8–7.7)
NEUTROPHILS NFR BLD: 51.9 % (ref 38–73)
NONHDLC SERPL-MCNC: 106 MG/DL
NRBC BLD-RTO: 0 /100 WBC
PLATELET # BLD AUTO: 243 K/UL (ref 150–450)
PMV BLD AUTO: 10.2 FL (ref 9.2–12.9)
POTASSIUM SERPL-SCNC: 4.1 MMOL/L (ref 3.5–5.1)
PROT SERPL-MCNC: 6.8 G/DL (ref 6–8.4)
RBC # BLD AUTO: 4.83 M/UL (ref 4.6–6.2)
SODIUM SERPL-SCNC: 139 MMOL/L (ref 136–145)
TRIGL SERPL-MCNC: 49 MG/DL (ref 30–150)
TSH SERPL DL<=0.005 MIU/L-ACNC: 1.24 UIU/ML (ref 0.4–4)
URATE SERPL-MCNC: 5.2 MG/DL (ref 3.4–7)
WBC # BLD AUTO: 3.45 K/UL (ref 3.9–12.7)

## 2022-09-14 PROCEDURE — 84443 ASSAY THYROID STIM HORMONE: CPT | Performed by: INTERNAL MEDICINE

## 2022-09-14 PROCEDURE — 99999 PR PBB SHADOW E&M-EST. PATIENT-LVL IV: CPT | Mod: PBBFAC,,, | Performed by: INTERNAL MEDICINE

## 2022-09-14 PROCEDURE — 36415 COLL VENOUS BLD VENIPUNCTURE: CPT | Mod: PO | Performed by: INTERNAL MEDICINE

## 2022-09-14 PROCEDURE — 86803 HEPATITIS C AB TEST: CPT | Performed by: INTERNAL MEDICINE

## 2022-09-14 PROCEDURE — 80053 COMPREHEN METABOLIC PANEL: CPT | Performed by: INTERNAL MEDICINE

## 2022-09-14 PROCEDURE — 80061 LIPID PANEL: CPT | Performed by: INTERNAL MEDICINE

## 2022-09-14 PROCEDURE — 84550 ASSAY OF BLOOD/URIC ACID: CPT | Performed by: INTERNAL MEDICINE

## 2022-09-14 PROCEDURE — 99385 PREV VISIT NEW AGE 18-39: CPT | Mod: S$GLB,,, | Performed by: INTERNAL MEDICINE

## 2022-09-14 PROCEDURE — 99385 PR PREVENTIVE VISIT,NEW,18-39: ICD-10-PCS | Mod: S$GLB,,, | Performed by: INTERNAL MEDICINE

## 2022-09-14 PROCEDURE — 99999 PR PBB SHADOW E&M-EST. PATIENT-LVL IV: ICD-10-PCS | Mod: PBBFAC,,, | Performed by: INTERNAL MEDICINE

## 2022-09-14 PROCEDURE — 83036 HEMOGLOBIN GLYCOSYLATED A1C: CPT | Performed by: INTERNAL MEDICINE

## 2022-09-14 PROCEDURE — 85025 COMPLETE CBC W/AUTO DIFF WBC: CPT | Performed by: INTERNAL MEDICINE

## 2022-09-15 NOTE — PROGRESS NOTES
CBC mild normocytic anemia, stable seen previously  CMP WNL  TSH WNL  Lipid WNL  HCV screening negative  Uric acid normal. Had episode of finger joint swelling, treated for cellulitis.  Think this is unlikely to be gout.  Results to pt via [employer].  PEx 1 year.
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
## 2023-02-08 ENCOUNTER — OFFICE VISIT (OUTPATIENT)
Dept: FAMILY MEDICINE | Facility: CLINIC | Age: 33
End: 2023-02-08
Payer: COMMERCIAL

## 2023-02-08 ENCOUNTER — HOSPITAL ENCOUNTER (OUTPATIENT)
Dept: RADIOLOGY | Facility: HOSPITAL | Age: 33
Discharge: HOME | End: 2023-02-08
Attending: INTERNAL MEDICINE
Payer: COMMERCIAL

## 2023-02-08 VITALS
TEMPERATURE: 99 F | DIASTOLIC BLOOD PRESSURE: 60 MMHG | HEART RATE: 68 BPM | SYSTOLIC BLOOD PRESSURE: 112 MMHG | WEIGHT: 171.88 LBS | HEIGHT: 75 IN | BODY MASS INDEX: 21.37 KG/M2 | OXYGEN SATURATION: 99 %

## 2023-02-08 DIAGNOSIS — G89.29 CHRONIC PAIN OF RIGHT KNEE: ICD-10-CM

## 2023-02-08 DIAGNOSIS — M25.561 CHRONIC PAIN OF RIGHT KNEE: ICD-10-CM

## 2023-02-08 DIAGNOSIS — I73.00 RAYNAUD'S PHENOMENON WITHOUT GANGRENE: Primary | ICD-10-CM

## 2023-02-08 DIAGNOSIS — L30.9 ECZEMA, UNSPECIFIED TYPE: ICD-10-CM

## 2023-02-08 PROCEDURE — 73560 X-RAY EXAM OF KNEE 1 OR 2: CPT | Mod: 26,RT,, | Performed by: RADIOLOGY

## 2023-02-08 PROCEDURE — 99214 OFFICE O/P EST MOD 30 MIN: CPT | Mod: S$GLB,,, | Performed by: INTERNAL MEDICINE

## 2023-02-08 PROCEDURE — 99214 PR OFFICE/OUTPT VISIT, EST, LEVL IV, 30-39 MIN: ICD-10-PCS | Mod: S$GLB,,, | Performed by: INTERNAL MEDICINE

## 2023-02-08 PROCEDURE — 73560 X-RAY EXAM OF KNEE 1 OR 2: CPT | Mod: TC,FY,PO,RT

## 2023-02-08 PROCEDURE — 99999 PR PBB SHADOW E&M-EST. PATIENT-LVL IV: ICD-10-PCS | Mod: PBBFAC,,, | Performed by: INTERNAL MEDICINE

## 2023-02-08 PROCEDURE — 73560 XR KNEE AP STANDING WITH RIGHT LATERAL: ICD-10-PCS | Mod: 26,RT,, | Performed by: RADIOLOGY

## 2023-02-08 PROCEDURE — 99999 PR PBB SHADOW E&M-EST. PATIENT-LVL IV: CPT | Mod: PBBFAC,,, | Performed by: INTERNAL MEDICINE

## 2023-02-08 RX ORDER — NIFEDIPINE 30 MG/1
30 TABLET, EXTENDED RELEASE ORAL DAILY
Qty: 30 TABLET | Refills: 5 | Status: SHIPPED | OUTPATIENT
Start: 2023-02-08 | End: 2023-09-14 | Stop reason: SDUPTHER

## 2023-02-08 RX ORDER — ACETAMINOPHEN AND CODEINE PHOSPHATE 300; 30 MG/1; MG/1
1-2 TABLET ORAL EVERY 6 HOURS PRN
COMMUNITY
Start: 2022-11-01 | End: 2023-02-08

## 2023-02-08 RX ORDER — IBUPROFEN 800 MG/1
800 TABLET ORAL 3 TIMES DAILY
COMMUNITY
Start: 2022-11-01 | End: 2023-06-02

## 2023-02-08 RX ORDER — DICLOFENAC SODIUM 10 MG/G
GEL TOPICAL
Qty: 100 G | Refills: 1 | Status: SHIPPED | OUTPATIENT
Start: 2023-02-08

## 2023-02-08 RX ORDER — TRIAMCINOLONE ACETONIDE 1 MG/G
CREAM TOPICAL 2 TIMES DAILY
Qty: 80 G | Refills: 0 | Status: SHIPPED | OUTPATIENT
Start: 2023-02-08 | End: 2023-02-22

## 2023-02-09 DIAGNOSIS — G89.29 CHRONIC PAIN OF RIGHT KNEE: Primary | ICD-10-CM

## 2023-02-09 DIAGNOSIS — M25.561 CHRONIC PAIN OF RIGHT KNEE: Primary | ICD-10-CM

## 2023-02-09 NOTE — PROGRESS NOTES
No fracture.  Question mild narrowing medial compartment, preserved lateral and patellofemoral compartments and no periarticular spurring.  No suprapatellar bursal effusion or prepatellar soft tissue swelling.    Had c/o R knee pain. Will refer to PT

## 2023-06-02 ENCOUNTER — HOSPITAL ENCOUNTER (EMERGENCY)
Facility: HOSPITAL | Age: 33
Discharge: HOME | End: 2023-06-02
Attending: EMERGENCY MEDICINE
Payer: COMMERCIAL

## 2023-06-02 VITALS
TEMPERATURE: 97 F | WEIGHT: 175 LBS | BODY MASS INDEX: 21.76 KG/M2 | RESPIRATION RATE: 12 BRPM | DIASTOLIC BLOOD PRESSURE: 63 MMHG | HEART RATE: 67 BPM | SYSTOLIC BLOOD PRESSURE: 113 MMHG | OXYGEN SATURATION: 100 % | HEIGHT: 75 IN

## 2023-06-02 DIAGNOSIS — H66.92 ACUTE OTITIS MEDIA, LEFT: Primary | ICD-10-CM

## 2023-06-02 PROCEDURE — 96372 THER/PROPH/DIAG INJ SC/IM: CPT | Performed by: EMERGENCY MEDICINE

## 2023-06-02 PROCEDURE — 63600175 PHARM REV CODE 636 W HCPCS: Mod: ER | Performed by: EMERGENCY MEDICINE

## 2023-06-02 PROCEDURE — 99284 EMERGENCY DEPT VISIT MOD MDM: CPT | Mod: ER

## 2023-06-02 PROCEDURE — 25000003 PHARM REV CODE 250: Mod: ER | Performed by: EMERGENCY MEDICINE

## 2023-06-02 RX ORDER — DOCUSATE SODIUM 50 MG/5ML
100 LIQUID ORAL
Status: COMPLETED | OUTPATIENT
Start: 2023-06-02 | End: 2023-06-02

## 2023-06-02 RX ORDER — KETOROLAC TROMETHAMINE 30 MG/ML
30 INJECTION, SOLUTION INTRAMUSCULAR; INTRAVENOUS
Status: COMPLETED | OUTPATIENT
Start: 2023-06-02 | End: 2023-06-02

## 2023-06-02 RX ORDER — AMOXICILLIN AND CLAVULANATE POTASSIUM 875; 125 MG/1; MG/1
1 TABLET, FILM COATED ORAL 2 TIMES DAILY
Qty: 20 TABLET | Refills: 0 | Status: SHIPPED | OUTPATIENT
Start: 2023-06-02 | End: 2023-06-12

## 2023-06-02 RX ORDER — ACETAMINOPHEN 500 MG
1000 TABLET ORAL EVERY 6 HOURS PRN
Qty: 30 TABLET | Refills: 0 | Status: SHIPPED | OUTPATIENT
Start: 2023-06-02 | End: 2023-09-14

## 2023-06-02 RX ORDER — IBUPROFEN 600 MG/1
600 TABLET ORAL EVERY 6 HOURS PRN
Qty: 20 TABLET | Refills: 0 | Status: SHIPPED | OUTPATIENT
Start: 2023-06-02

## 2023-06-02 RX ADMIN — KETOROLAC TROMETHAMINE 30 MG: 30 INJECTION, SOLUTION INTRAMUSCULAR; INTRAVENOUS at 03:06

## 2023-06-02 RX ADMIN — DOCUSATE SODIUM LIQUID 100 MG: 100 LIQUID ORAL at 04:06

## 2023-06-02 NOTE — ED PROVIDER NOTES
"Encounter Date: 6/2/2023    SCRIBE #1 NOTE: I, ANTONIETA Akhtar, am scribing for, and in the presence of,  Romy Interiano DO. I have scribed the following portions of the note - Other sections scribed: HPI, ROS, PE, MDM.     History     Chief Complaint   Patient presents with    Headache     C/o headache/bilateral ear pain x1 week. Pt reports pain 8/10. Pt took Ibuprofen this morning with minimal relief. Denies vision changes, lightheadedness, dizziness.     Rosemary Aleman is a 33 y.o. male, with no pertinent PMHx, who presents to the ED with chief complaint of bilateral otalgia which started 1 week ago. Right ear has swelling and white drainage. Left ear has constant "aching" pain throughout the day. Pt reports "he had tubes placed in ears when he was younger and was prone to frequent ear infections". He also had a cyst in right ear ~1 year ago. He denies any recent swimming. Pt attempted treatment with ibuprofen and tylenol to no relief. No other exacerbating or alleviating factors. Denies fever, chills, rhinorrhea, congestion, sneezing, watery eyes, chest pain, SOB, headaches, or other associated symptoms. NKDA. Denies use of any daily medications. Denies use of tobacco or alcohol, but endorses use of marijuana. Marijuana last smoked 1200 today.    The history is provided by the patient. No  was used.   Review of patient's allergies indicates:  No Known Allergies  History reviewed. No pertinent past medical history.  Past Surgical History:   Procedure Laterality Date    KNEE SURGERY Right     testicle surgery for torsion       Family History   Problem Relation Age of Onset    Asthma Mother     Diabetes Father     No Known Problems Sister     No Known Problems Brother     No Known Problems Brother     No Known Problems Brother     No Known Problems Brother     No Known Problems Son     No Known Problems Son     Allergic rhinitis Daughter      Social History     Tobacco Use    Smoking status: " Never    Smokeless tobacco: Never   Substance Use Topics    Alcohol use: No    Drug use: Yes     Types: Marijuana     Review of Systems   Constitutional:  Negative for fever.   HENT:  Positive for ear discharge and ear pain. Negative for congestion, rhinorrhea, sneezing and sore throat.         (+) Ear swelling   Eyes:  Negative for discharge.   Respiratory:  Negative for shortness of breath.    Cardiovascular:  Negative for chest pain and leg swelling.   Skin:  Negative for rash.   Neurological:  Negative for numbness and headaches.   All other systems reviewed and are negative.    Physical Exam   Patient gave consent to have physical exam performed.  Initial Vitals [06/02/23 1454]   BP Pulse Resp Temp SpO2   121/81 (!) 59 17 97.4 °F (36.3 °C) 98 %      MAP       --         Physical Exam    Nursing note and vitals reviewed.  Constitutional: He appears well-developed and well-nourished.   HENT:   Head: Normocephalic and atraumatic.   Right Ear: External ear normal.   Left Ear: External ear normal. Tympanic membrane is erythematous.   Nose: Nose normal.   Mouth/Throat: Oropharynx is clear and moist.   Right ear full of wax   Eyes: Conjunctivae and EOM are normal. Pupils are equal, round, and reactive to light.   Neck: Neck supple.   Normal range of motion.  Cardiovascular:  Normal rate, regular rhythm and normal heart sounds.     Exam reveals no gallop and no friction rub.       No murmur heard.  Pulmonary/Chest: Breath sounds normal. No respiratory distress. He has no wheezes. He has no rhonchi. He has no rales.   Abdominal: Abdomen is soft. Bowel sounds are normal. There is no abdominal tenderness. There is no rebound and no guarding.   Musculoskeletal:         General: No tenderness or edema. Normal range of motion.      Cervical back: Normal range of motion and neck supple.     Neurological: He is alert and oriented to person, place, and time. No cranial nerve deficit.   Skin: Skin is warm and dry. Capillary  refill takes less than 2 seconds. No rash noted.   Psychiatric: He has a normal mood and affect. His behavior is normal.       ED Course   Procedures  Labs Reviewed - No data to display              Medications   ketorolac injection 30 mg (30 mg Intramuscular Given 6/2/23 1559)   docusate 50 mg/5 mL liquid 100 mg (100 mg Oral Given 6/2/23 1600)     Medical Decision Making:   History:   Old Medical Records: I decided to obtain old medical records.                      Medical Decision Making:    This is an evaluation of a 33 y.o. male that presents to the Emergency Department for   Chief Complaint   Patient presents with    Headache     C/o headache/bilateral ear pain x1 week. Pt reports pain 8/10. Pt took Ibuprofen this morning with minimal relief. Denies vision changes, lightheadedness, dizziness.       The patient is a non-toxic and well appearing patient. On physical exam, patient appears well hydrated with moist mucus membranes.  Patient is tolerating PO without difficulty.  Vital Signs Are Reassuring.     Based on the patient's symptoms, I am considering and evaluating for the following differential diagnoses: ear pain, otitis media, otitis externa, cerumen impaction.    ED Course:Treatment in the ED included Physical Exam and medications given in ED  Medications   ketorolac injection 30 mg (30 mg Intramuscular Given 6/2/23 1559)   docusate 50 mg/5 mL liquid 100 mg (100 mg Oral Given 6/2/23 1600)   .   Patient reports feeling better after treatment in the ER.     Risk  Diagnosis or treatment significantly limited by the following social determinants of health: Body mass index is 21.87 kg/m².     In shared decision making with the patient, we discussed treatment, prescriptions, labs, and imaging results.    Discharge home with   ED Prescriptions       Medication Sig Dispense Start Date End Date Auth. Provider    amoxicillin-clavulanate 875-125mg (AUGMENTIN) 875-125 mg per tablet Take 1 tablet by mouth 2 (two)  times daily. for 10 days 20 tablet 6/2/2023 6/12/2023 Romy Interiano,     ibuprofen (ADVIL,MOTRIN) 600 MG tablet Take 1 tablet (600 mg total) by mouth every 6 (six) hours as needed for Pain (Take with food as needed for mild-to-moderate pain). 20 tablet 6/2/2023 -- Romy Interiano, DO    acetaminophen (TYLENOL) 500 MG tablet Take 2 tablets (1,000 mg total) by mouth every 6 (six) hours as needed for Pain (As needed for pain and fever). 30 tablet 6/2/2023 -- Romy Interiano, DO    carbamide peroxide (DEBROX) 6.5 % otic solution Place 5 drops into both ears as needed (As needed for ear wax). 15 mL 6/2/2023 -- Romy Interiano DO          Fill and take prescriptions as directed.  Return to the ED if symptoms worsen or do not resolve.   Answered questions and discussed discharge plan.    Patient feels better and is ready for discharge.  Follow up with PCP/specialist in 1 day    The following labs and imaging were reviewed:    No visits with results within 1 Day(s) from this visit.   Latest known visit with results is:   Lab Visit on 09/14/2022   Component Date Value Ref Range Status    WBC 09/14/2022 3.45 (L)  3.90 - 12.70 K/uL Final    RBC 09/14/2022 4.83  4.60 - 6.20 M/uL Final    Hemoglobin 09/14/2022 13.6 (L)  14.0 - 18.0 g/dL Final    Hematocrit 09/14/2022 42.2  40.0 - 54.0 % Final    MCV 09/14/2022 87  82 - 98 fL Final    MCH 09/14/2022 28.2  27.0 - 31.0 pg Final    MCHC 09/14/2022 32.2  32.0 - 36.0 g/dL Final    RDW 09/14/2022 13.6  11.5 - 14.5 % Final    Platelets 09/14/2022 243  150 - 450 K/uL Final    MPV 09/14/2022 10.2  9.2 - 12.9 fL Final    Immature Granulocytes 09/14/2022 0.0  0.0 - 0.5 % Final    Gran # (ANC) 09/14/2022 1.8  1.8 - 7.7 K/uL Final    Immature Grans (Abs) 09/14/2022 0.00  0.00 - 0.04 K/uL Final    Comment: Mild elevation in immature granulocytes is non specific and   can be seen in a variety of conditions including stress response,   acute inflammation, trauma and pregnancy. Correlation with other    laboratory and clinical findings is essential.      Lymph # 09/14/2022 1.1  1.0 - 4.8 K/uL Final    Mono # 09/14/2022 0.4  0.3 - 1.0 K/uL Final    Eos # 09/14/2022 0.2  0.0 - 0.5 K/uL Final    Baso # 09/14/2022 0.03  0.00 - 0.20 K/uL Final    nRBC 09/14/2022 0  0 /100 WBC Final    Gran % 09/14/2022 51.9  38.0 - 73.0 % Final    Lymph % 09/14/2022 30.7  18.0 - 48.0 % Final    Mono % 09/14/2022 11.9  4.0 - 15.0 % Final    Eosinophil % 09/14/2022 4.6  0.0 - 8.0 % Final    Basophil % 09/14/2022 0.9  0.0 - 1.9 % Final    Differential Method 09/14/2022 Automated   Final    Sodium 09/14/2022 139  136 - 145 mmol/L Final    Potassium 09/14/2022 4.1  3.5 - 5.1 mmol/L Final    Chloride 09/14/2022 104  95 - 110 mmol/L Final    CO2 09/14/2022 28  23 - 29 mmol/L Final    Glucose 09/14/2022 91  70 - 110 mg/dL Final    BUN 09/14/2022 13  6 - 20 mg/dL Final    Creatinine 09/14/2022 1.2  0.5 - 1.4 mg/dL Final    Calcium 09/14/2022 9.8  8.7 - 10.5 mg/dL Final    Total Protein 09/14/2022 6.8  6.0 - 8.4 g/dL Final    Albumin 09/14/2022 4.2  3.5 - 5.2 g/dL Final    Total Bilirubin 09/14/2022 0.5  0.1 - 1.0 mg/dL Final    Comment: For infants and newborns, interpretation of results should be based  on gestational age, weight and in agreement with clinical  observations.    Premature Infant recommended reference ranges:  Up to 24 hours.............<8.0 mg/dL  Up to 48 hours............<12.0 mg/dL  3-5 days..................<15.0 mg/dL  6-29 days.................<15.0 mg/dL      Alkaline Phosphatase 09/14/2022 55  55 - 135 U/L Final    AST 09/14/2022 22  10 - 40 U/L Final    ALT 09/14/2022 16  10 - 44 U/L Final    Anion Gap 09/14/2022 7 (L)  8 - 16 mmol/L Final    eGFR 09/14/2022 >60.0  >60 mL/min/1.73 m^2 Final    Cholesterol 09/14/2022 163  120 - 199 mg/dL Final    Comment: The National Cholesterol Education Program (NCEP) has set the  following guidelines (reference ranges) for Cholesterol:  Optimal.....................<200  mg/dL  Borderline High.............200-239 mg/dL  High........................> or = 240 mg/dL      Triglycerides 09/14/2022 49  30 - 150 mg/dL Final    Comment: The National Cholesterol Education Program (NCEP) has set the  following guidelines (reference values) for triglycerides:  Normal......................<150 mg/dL  Borderline High.............150-199 mg/dL  High........................200-499 mg/dL      HDL 09/14/2022 57  40 - 75 mg/dL Final    Comment: The National Cholesterol Education Program (NCEP) has set the  following guidelines (reference values) for HDL Cholesterol:  Low...............<40 mg/dL  Optimal...........>60 mg/dL      LDL Cholesterol 09/14/2022 96.2  63.0 - 159.0 mg/dL Final    Comment: The National Cholesterol Education Program (NCEP) has set the  following guidelines (reference values) for LDL Cholesterol:  Optimal.......................<130 mg/dL  Borderline High...............130-159 mg/dL  High..........................160-189 mg/dL  Very High.....................>190 mg/dL      HDL/Cholesterol Ratio 09/14/2022 35.0  20.0 - 50.0 % Final    Total Cholesterol/HDL Ratio 09/14/2022 2.9  2.0 - 5.0 Final    Non-HDL Cholesterol 09/14/2022 106  mg/dL Final    Comment: Risk category and Non-HDL cholesterol goals:  Coronary heart disease (CHD)or equivalent (10-year risk of CHD >20%):  Non-HDL cholesterol goal     <130 mg/dL  Two or more CHD risk factors and 10-year risk of CHD <= 20%:  Non-HDL cholesterol goal     <160 mg/dL  0 to 1 CHD risk factor:  Non-HDL cholesterol goal     <190 mg/dL      Hemoglobin A1C 09/14/2022 4.9  4.0 - 5.6 % Final    Comment: ADA Screening Guidelines:  5.7-6.4%  Consistent with prediabetes  >or=6.5%  Consistent with diabetes    High levels of fetal hemoglobin interfere with the HbA1C  assay. Heterozygous hemoglobin variants (HbS, HgC, etc)do  not significantly interfere with this assay.   However, presence of multiple variants may affect accuracy.      Estimated Avg  Glucose 09/14/2022 94  68 - 131 mg/dL Final    TSH 09/14/2022 1.240  0.400 - 4.000 uIU/mL Final    Uric Acid 09/14/2022 5.2  3.4 - 7.0 mg/dL Final    Hepatitis C Ab 09/14/2022 Non-reactive  Non-reactive Final             I, Dr. Romy Interiano, personally performed the services described in this documentation. This document was produced by a scribe under my direction and in my presence. All medical record entries made by the scribe were at my direction and in my presence.  I have reviewed the chart and agree that the record reflects my personal performance and is accurate and complete. Romy Interiano DO.     06/03/2023 3:32 PM    Clinical Impression:   Final diagnoses:  [H66.92] Acute otitis media, left (Primary)        ED Disposition Condition    Discharge Stable          ED Prescriptions       Medication Sig Dispense Start Date End Date Auth. Provider    amoxicillin-clavulanate 875-125mg (AUGMENTIN) 875-125 mg per tablet Take 1 tablet by mouth 2 (two) times daily. for 10 days 20 tablet 6/2/2023 6/12/2023 Romy Interiano DO    ibuprofen (ADVIL,MOTRIN) 600 MG tablet Take 1 tablet (600 mg total) by mouth every 6 (six) hours as needed for Pain (Take with food as needed for mild-to-moderate pain). 20 tablet 6/2/2023 -- Romy Interiano DO    acetaminophen (TYLENOL) 500 MG tablet Take 2 tablets (1,000 mg total) by mouth every 6 (six) hours as needed for Pain (As needed for pain and fever). 30 tablet 6/2/2023 -- Romy Interiano DO    carbamide peroxide (DEBROX) 6.5 % otic solution Place 5 drops into both ears as needed (As needed for ear wax). 15 mL 6/2/2023 -- Romy Interiano DO          Follow-up Information       Follow up With Specialties Details Why Contact Info Additional Information    Mauro Esquivel MD Internal Medicine Schedule an appointment as soon as possible for a visit  As needed [address]  [place] [address]  [phone]       [facility] [place] [facility] Otolaryngology Schedule an appointment as soon as  possible for a visit in 1 day  [address]  [facility] [address] [phone] Please park in garage or surface lot and use Medical Office Bldg entrance. Check in at [address].              Romy Interiano DO  06/03/23 1538

## 2023-06-02 NOTE — Clinical Note
"Rosemary Lewis" Ash was seen and treated in our emergency department on 6/2/2023.  He may return to work on 06/04/2023.       If you have any questions or concerns, please don't hesitate to call.      Romy Interiano, DO"

## 2023-09-03 ENCOUNTER — HOSPITAL ENCOUNTER (EMERGENCY)
Facility: HOSPITAL | Age: 33
Discharge: HOME | End: 2023-09-03
Attending: EMERGENCY MEDICINE
Payer: COMMERCIAL

## 2023-09-03 VITALS
BODY MASS INDEX: 21.76 KG/M2 | RESPIRATION RATE: 18 BRPM | SYSTOLIC BLOOD PRESSURE: 120 MMHG | WEIGHT: 175 LBS | TEMPERATURE: 98 F | HEART RATE: 65 BPM | DIASTOLIC BLOOD PRESSURE: 74 MMHG | HEIGHT: 75 IN | OXYGEN SATURATION: 100 %

## 2023-09-03 DIAGNOSIS — K61.0 PERIANAL ABSCESS: Primary | ICD-10-CM

## 2023-09-03 LAB
ALBUMIN SERPL BCP-MCNC: 3.9 G/DL (ref 3.5–5.2)
ALP SERPL-CCNC: 102 U/L (ref 55–135)
ALT SERPL W/O P-5'-P-CCNC: 30 U/L (ref 10–44)
ANION GAP SERPL CALC-SCNC: 10 MMOL/L (ref 8–16)
AST SERPL-CCNC: 21 U/L (ref 10–40)
BASOPHILS # BLD AUTO: 0.03 K/UL (ref 0–0.2)
BASOPHILS NFR BLD: 0.4 % (ref 0–1.9)
BILIRUB SERPL-MCNC: 0.5 MG/DL (ref 0.1–1)
BUN SERPL-MCNC: 13 MG/DL (ref 6–20)
CALCIUM SERPL-MCNC: 9.5 MG/DL (ref 8.7–10.5)
CHLORIDE SERPL-SCNC: 105 MMOL/L (ref 95–110)
CO2 SERPL-SCNC: 26 MMOL/L (ref 23–29)
CREAT SERPL-MCNC: 1.2 MG/DL (ref 0.5–1.4)
DIFFERENTIAL METHOD: ABNORMAL
EOSINOPHIL # BLD AUTO: 0.3 K/UL (ref 0–0.5)
EOSINOPHIL NFR BLD: 5 % (ref 0–8)
ERYTHROCYTE [DISTWIDTH] IN BLOOD BY AUTOMATED COUNT: 13.1 % (ref 11.5–14.5)
EST. GFR  (NO RACE VARIABLE): >60 ML/MIN/1.73 M^2
GLUCOSE SERPL-MCNC: 71 MG/DL (ref 70–110)
HCT VFR BLD AUTO: 45.5 % (ref 40–54)
HGB BLD-MCNC: 14.4 G/DL (ref 14–18)
IMM GRANULOCYTES # BLD AUTO: 0.01 K/UL (ref 0–0.04)
IMM GRANULOCYTES NFR BLD AUTO: 0.1 % (ref 0–0.5)
LYMPHOCYTES # BLD AUTO: 1.6 K/UL (ref 1–4.8)
LYMPHOCYTES NFR BLD: 23.1 % (ref 18–48)
MCH RBC QN AUTO: 27.7 PG (ref 27–31)
MCHC RBC AUTO-ENTMCNC: 31.6 G/DL (ref 32–36)
MCV RBC AUTO: 88 FL (ref 82–98)
MONOCYTES # BLD AUTO: 0.8 K/UL (ref 0.3–1)
MONOCYTES NFR BLD: 12 % (ref 4–15)
NEUTROPHILS # BLD AUTO: 4.1 K/UL (ref 1.8–7.7)
NEUTROPHILS NFR BLD: 59.4 % (ref 38–73)
NRBC BLD-RTO: 0 /100 WBC
PLATELET # BLD AUTO: 237 K/UL (ref 150–450)
PMV BLD AUTO: 9.6 FL (ref 9.2–12.9)
POTASSIUM SERPL-SCNC: 4.1 MMOL/L (ref 3.5–5.1)
PROT SERPL-MCNC: 7.5 G/DL (ref 6–8.4)
RBC # BLD AUTO: 5.19 M/UL (ref 4.6–6.2)
SODIUM SERPL-SCNC: 141 MMOL/L (ref 136–145)
WBC # BLD AUTO: 6.84 K/UL (ref 3.9–12.7)

## 2023-09-03 PROCEDURE — 96374 THER/PROPH/DIAG INJ IV PUSH: CPT | Mod: 59

## 2023-09-03 PROCEDURE — 63600175 PHARM REV CODE 636 W HCPCS

## 2023-09-03 PROCEDURE — 80053 COMPREHEN METABOLIC PANEL: CPT

## 2023-09-03 PROCEDURE — 25000003 PHARM REV CODE 250

## 2023-09-03 PROCEDURE — 85025 COMPLETE CBC W/AUTO DIFF WBC: CPT

## 2023-09-03 PROCEDURE — 99285 EMERGENCY DEPT VISIT HI MDM: CPT | Mod: 25

## 2023-09-03 PROCEDURE — 25500020 PHARM REV CODE 255

## 2023-09-03 PROCEDURE — 46050 I&D PERIANAL ABSCESS SUPFC: CPT

## 2023-09-03 RX ORDER — SULFAMETHOXAZOLE AND TRIMETHOPRIM 800; 160 MG/1; MG/1
1 TABLET ORAL 2 TIMES DAILY
Qty: 14 TABLET | Refills: 0 | Status: SHIPPED | OUTPATIENT
Start: 2023-09-03 | End: 2023-09-14 | Stop reason: ALTCHOICE

## 2023-09-03 RX ORDER — LIDOCAINE HYDROCHLORIDE 10 MG/ML
10 INJECTION INFILTRATION; PERINEURAL
Status: COMPLETED | OUTPATIENT
Start: 2023-09-03 | End: 2023-09-03

## 2023-09-03 RX ORDER — NAPROXEN 500 MG/1
500 TABLET ORAL 2 TIMES DAILY
Qty: 20 TABLET | Refills: 0 | Status: SHIPPED | OUTPATIENT
Start: 2023-09-03 | End: 2023-09-14

## 2023-09-03 RX ORDER — KETOROLAC TROMETHAMINE 30 MG/ML
15 INJECTION, SOLUTION INTRAMUSCULAR; INTRAVENOUS
Status: COMPLETED | OUTPATIENT
Start: 2023-09-03 | End: 2023-09-03

## 2023-09-03 RX ADMIN — KETOROLAC TROMETHAMINE 15 MG: 30 INJECTION, SOLUTION INTRAMUSCULAR; INTRAVENOUS at 02:09

## 2023-09-03 RX ADMIN — IOHEXOL 85 ML: 350 INJECTION, SOLUTION INTRAVENOUS at 04:09

## 2023-09-03 RX ADMIN — LIDOCAINE HYDROCHLORIDE 10 ML: 10 INJECTION, SOLUTION INFILTRATION; PERINEURAL at 04:09

## 2023-09-03 NOTE — Clinical Note
"Rosemary Lewis" Ash was seen and treated in our emergency department on 9/3/2023.  He may return to work on 09/04/2023.       If you have any questions or concerns, please don't hesitate to call.      Casper Lan PA-C"

## 2023-09-03 NOTE — DISCHARGE INSTRUCTIONS

## 2023-09-03 NOTE — ED PROVIDER NOTES
Encounter Date: 9/3/2023       History     Chief Complaint   Patient presents with    Abscess     Pt to ER with reports of abscess on backside. Pt denies drainage      33-year-old male with no past medical history presents to ED for emergent evaluation of an abscess to his buttocks that he noticed last week.  He denies any associated drainage.  He denies any fever, chills, chest pain, shortness of breath, abdominal pain, nausea, vomiting, diarrhea, dysuria, hematuria, blood in stool.  No attempted treatment reported.  No other symptoms reported.    The history is provided by the patient. No  was used.     Review of patient's allergies indicates:  No Known Allergies  History reviewed. No pertinent past medical history.  Past Surgical History:   Procedure Laterality Date    KNEE SURGERY Right     testicle surgery for torsion       Family History   Problem Relation Age of Onset    Asthma Mother     Diabetes Father     No Known Problems Sister     No Known Problems Brother     No Known Problems Brother     No Known Problems Brother     No Known Problems Brother     No Known Problems Son     No Known Problems Son     Allergic rhinitis Daughter      Social History     Tobacco Use    Smoking status: Never    Smokeless tobacco: Never   Substance Use Topics    Alcohol use: No    Drug use: Yes     Types: Marijuana     Review of Systems   Constitutional:  Negative for chills and fever.   HENT:  Negative for congestion, ear pain, rhinorrhea and sore throat.    Eyes:  Negative for redness.   Respiratory:  Negative for cough and shortness of breath.    Cardiovascular:  Negative for chest pain.   Gastrointestinal:  Negative for abdominal pain, diarrhea, nausea and vomiting.   Genitourinary:  Negative for decreased urine volume, difficulty urinating, dysuria, frequency, hematuria and urgency.   Musculoskeletal:  Negative for back pain and neck pain.   Skin:  Negative for rash.        (+) perianal abscess    Neurological:  Negative for headaches.   Psychiatric/Behavioral:  Negative for confusion.        Physical Exam     Initial Vitals [09/03/23 1333]   BP Pulse Resp Temp SpO2   133/88 63 18 98.3 °F (36.8 °C) 100 %      MAP       --         Physical Exam    Nursing note and vitals reviewed.  Constitutional: He appears well-developed and well-nourished. He is not diaphoretic.  Non-toxic appearance. No distress.   HENT:   Head: Normocephalic and atraumatic.   Right Ear: Hearing, tympanic membrane, external ear and ear canal normal. Tympanic membrane is not perforated, not erythematous and not bulging.   Left Ear: Hearing, tympanic membrane, external ear and ear canal normal. Tympanic membrane is not perforated, not erythematous and not bulging.   Nose: Nose normal.   Mouth/Throat: Uvula is midline and oropharynx is clear and moist.   Eyes: Conjunctivae and EOM are normal.   Neck: Neck supple.   Normal range of motion.   Full passive range of motion without pain.     Cardiovascular:            Pulses:       Radial pulses are 2+ on the right side and 2+ on the left side.   Pulmonary/Chest: Effort normal and breath sounds normal. No respiratory distress. He has no decreased breath sounds.   Abdominal: Abdomen is soft and flat. There is no abdominal tenderness.   No right CVA tenderness.  No left CVA tenderness.   Genitourinary:    Genitourinary Comments:  exam chaperoned by nurseCarol.  2 x 3 cm perianal abscess to right buttock.  No surrounding areas of erythema or cellulitis.  Normal rectal tone.  Guaiac negative.     Musculoskeletal:      Cervical back: Full passive range of motion without pain, normal range of motion and neck supple. No rigidity.     Neurological: He is alert. No cranial nerve deficit.   Neuro intact.  Strength and sensation intact to bilateral upper and lower extremities.   Skin: Skin is warm and dry. No rash noted.         ED Course   I & D - Incision and Drainage    Date/Time: 9/3/2023 5:18  PM  Location procedure was performed: [facility] EMERGENCY DEPARTMENT    Performed by: Casper Lan PA-C  Authorized by: Bean Evangelista MD  Consent Done: Yes  Consent: Verbal consent obtained.  Risks and benefits: risks, benefits and alternatives were discussed  Consent given by: patient  Type: abscess  Body area: anogenital  Location details: perianal  Anesthesia: local infiltration    Anesthesia:  Local Anesthetic: lidocaine 1% without epinephrine  Scalpel size: 11  Incision type: single straight  Incision depth: dermal  Complexity: simple  Drainage: pus, serosanguinous, serous, bloody, purulent and viscous  Drainage amount: copious  Wound treatment: incision, drainage, expression of material and deloculation  Patient tolerance: Patient tolerated the procedure well with no immediate complications    Incision depth: dermal        Labs Reviewed   CBC W/ AUTO DIFFERENTIAL - Abnormal; Notable for the following components:       Result Value    MCHC 31.6 (*)     All other components within normal limits   COMPREHENSIVE METABOLIC PANEL          Imaging Results               CT Abdomen Pelvis With Contrast (Final result)  Result time 09/03/23 16:24:56      Final result by Rafita Chawla MD (09/03/23 16:24:56)                   Impression:      This report was flagged in Epic as abnormal.    1. Right paramedian gluteal fold abscess, just posterior to the anus.  Correlation is advised.  Prominent right inguinal lymph nodes, likely reactive to the same.  Follow-up recommended.  2. Hepatomegaly noting possible hepatic steatosis, correlation with LFTs recommended.  3. Moderate stool within the colon, developing constipation is a consideration.  4. Please see above for additional findings.      Electronically signed by: Rafita Chawla MD  Date:    09/03/2023  Time:    16:24               Narrative:    EXAMINATION:  CT ABDOMEN PELVIS WITH CONTRAST    CLINICAL HISTORY:  perianal abscess;    TECHNIQUE:  Low dose axial  images, sagittal and coronal reformations were obtained from the lung bases to the pubic symphysis following the IV administration of 85 mL of Omnipaque 350 .  Oral contrast was not given.    COMPARISON:  None.    FINDINGS:  Images of the lower thorax are unremarkable.    The liver is hypoattenuating, possibly reflecting steatosis versus contrast phase, correlation with LFTs as warranted.  The liver is enlarged.  The spleen, pancreas, gallbladder and adrenal glands are grossly unremarkable noting motion artifact.  The stomach is decompressed without wall thickening.  The portal vein, splenic vein, SMV, celiac axis and SMA all are patent.  No significant abdominal lymphadenopathy.    The kidneys enhance symmetrically without hydronephrosis or nephrolithiasis.  The bilateral ureters are unable to be followed in their entirety to the urinary bladder, no definite calculi seen or secondary findings to suggest obstructive uropathy.  The urinary bladder is nondistended.  The prostate is not enlarged.    There is moderate stool in the colon.  The terminal ileum is unremarkable.  The appendix is not confidently identified, no pericecal inflammation.  The small bowel is grossly unremarkable.  There are a few scattered shotty periaortic, pericaval, and mesenteric lymph nodes.  No focal organized pelvic fluid collection.    The osseous structures are intact.  There are a few scattered prominent right inguinal lymph nodes.  Along the right paramedian gluteal soft tissues, at the gluteal fold, there is a rim enhancing collection measuring approximately 2.2 x 1.1 cm concerning for abscess.                                       Medications   ketorolac injection 15 mg (15 mg Intravenous Given 9/3/23 1453)   iohexoL (OMNIPAQUE 350) injection 85 mL (85 mLs Intravenous Given 9/3/23 1608)   LIDOcaine HCL 10 mg/ml (1%) injection 10 mL (10 mLs Infiltration Given 9/3/23 1632)     Medical Decision Making  This is a 33-year-old male with no  past medical history presents to ED for emergent evaluation of an abscess to his buttocks that he noticed last week.   On physical exam, patient is well-appearing and in no acute distress.  Nontoxic appearing.  Lungs are clear to auscultation bilaterally.  Abdomen is soft and nontender.  No guarding, rigidity, rebound.  2+ radial pulses bilaterally.  Posterior oropharynx is not erythematous.  No edema or exudate.  Uvula midline.  Bilateral tympanic membrane is normal.  No erythema, bulging, or perforations.  Neuro intact.  Strength and sensation intact bilateral upper and lower extremities.  exam chaperoned by nurseCarol.  2 x 3 cm perianal abscess to right buttock.  No surrounding areas of erythema or cellulitis.  Normal rectal tone.  Guaiac negative.  CBC without evidence of any leukocytosis or anemia.  CMP unremarkable.  Doubt electrolyte abnormality.  Toradol ordered.  Will reassess.  CT revealed 1. Right paramedian gluteal fold abscess, just posterior to the anus.  Correlation is advised.  Prominent right inguinal lymph nodes, likely reactive to the same.  Follow-up recommended.   2. Hepatomegaly noting possible hepatic steatosis, correlation with LFTs recommended.   3. Moderate stool within the colon, developing constipation is a consideration.   No evidence abscess seeding into the rectum or pelvis.  I&D performed.  Patient tolerated the procedure well with no acute complications.  Copious amounts of serous, serosanguineous, purulent, bloody drainage were expressed.  Wound was de loculated.  Will discharge patient on Bactrim secondary to area of abscess.  Will also discharge patient on anti-inflammatories as needed for pain.  Urged prompt follow-up with PCP for further evaluation.    Strict return precautions given. I discussed with the patient/family the diagnosis, treatment plan, indications for return to the emergency department, and for expected follow-up. The patient/family verbalized an  understanding. The patient/family is asked if there are any questions or concerns. We discuss the case, until all issues are addressed to the patient/family's satisfaction. Patient/family understands and is agreeable to the plan. Patient is stable and ready for discharge.      Amount and/or Complexity of Data Reviewed  Labs: ordered.  Radiology: ordered.    Risk  Prescription drug management.                               Clinical Impression:   Final diagnoses:  [K61.0] Perianal abscess (Primary)        ED Disposition Condition    Discharge Stable          ED Prescriptions       Medication Sig Dispense Start Date End Date Auth. Provider    naproxen (NAPROSYN) 500 MG tablet Take 1 tablet (500 mg total) by mouth 2 (two) times daily. 20 tablet 9/3/2023 -- Casper Lan PA-C    sulfamethoxazole-trimethoprim 800-160mg (BACTRIM DS) 800-160 mg Tab Take 1 tablet by mouth 2 (two) times daily. for 7 days 14 tablet 9/3/2023 9/10/2023 Casepr Lan PA-C          Follow-up Information       Follow up With Specialties Details Why Contact Info    Mauro Esquivel MD Internal Medicine In 2 days for further evaluation [address] [place] [address]  [phone]      [facility] [place] Emergency Medicine In 2 days If symptoms worsen [address]  [facility] [address] [phone]             Casper Lan PA-C  09/03/23 4287

## 2023-09-03 NOTE — ED TRIAGE NOTES
Pt to ER with reports of abscess on right buttocks since Monday. Pt denies drainage.   Pt reports pain but denies any other symptoms.   Pt [id number]
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
## 2023-09-14 ENCOUNTER — OFFICE VISIT (OUTPATIENT)
Dept: FAMILY MEDICINE | Facility: CLINIC | Age: 33
End: 2023-09-14
Payer: COMMERCIAL

## 2023-09-14 VITALS
DIASTOLIC BLOOD PRESSURE: 70 MMHG | WEIGHT: 176.38 LBS | SYSTOLIC BLOOD PRESSURE: 102 MMHG | BODY MASS INDEX: 21.93 KG/M2 | OXYGEN SATURATION: 99 % | TEMPERATURE: 98 F | HEIGHT: 75 IN | HEART RATE: 57 BPM

## 2023-09-14 DIAGNOSIS — I73.00 RAYNAUD'S PHENOMENON WITHOUT GANGRENE: ICD-10-CM

## 2023-09-14 DIAGNOSIS — Z00.00 NORMAL PHYSICAL EXAM: Primary | ICD-10-CM

## 2023-09-14 PROCEDURE — 99395 PR PREVENTIVE VISIT,EST,18-39: ICD-10-PCS | Mod: S$GLB,,, | Performed by: INTERNAL MEDICINE

## 2023-09-14 PROCEDURE — 99395 PREV VISIT EST AGE 18-39: CPT | Mod: S$GLB,,, | Performed by: INTERNAL MEDICINE

## 2023-09-14 PROCEDURE — 99999 PR PBB SHADOW E&M-EST. PATIENT-LVL IV: ICD-10-PCS | Mod: PBBFAC,,, | Performed by: INTERNAL MEDICINE

## 2023-09-14 PROCEDURE — 99999 PR PBB SHADOW E&M-EST. PATIENT-LVL IV: CPT | Mod: PBBFAC,,, | Performed by: INTERNAL MEDICINE

## 2023-09-14 RX ORDER — NIFEDIPINE 60 MG/1
60 TABLET, EXTENDED RELEASE ORAL DAILY
Qty: 30 TABLET | Refills: 5 | Status: SHIPPED | OUTPATIENT
Start: 2023-09-14 | End: 2024-09-13

## 2024-07-23 ENCOUNTER — HOSPITAL ENCOUNTER (EMERGENCY)
Facility: HOSPITAL | Age: 34
Discharge: HOME | End: 2024-07-23
Attending: EMERGENCY MEDICINE
Payer: COMMERCIAL

## 2024-07-23 VITALS
WEIGHT: 175 LBS | OXYGEN SATURATION: 96 % | RESPIRATION RATE: 18 BRPM | HEART RATE: 68 BPM | BODY MASS INDEX: 21.31 KG/M2 | SYSTOLIC BLOOD PRESSURE: 123 MMHG | DIASTOLIC BLOOD PRESSURE: 74 MMHG | HEIGHT: 76 IN | TEMPERATURE: 98 F

## 2024-07-23 DIAGNOSIS — U07.1 COVID-19: Primary | ICD-10-CM

## 2024-07-23 LAB
CTP QC/QA: YES
CTP QC/QA: YES
POC MOLECULAR INFLUENZA A AGN: NEGATIVE
POC MOLECULAR INFLUENZA B AGN: NEGATIVE
SARS-COV-2 RDRP RESP QL NAA+PROBE: POSITIVE

## 2024-07-23 PROCEDURE — 99284 EMERGENCY DEPT VISIT MOD MDM: CPT

## 2024-07-23 PROCEDURE — 87502 INFLUENZA DNA AMP PROBE: CPT

## 2024-07-23 PROCEDURE — 87635 SARS-COV-2 COVID-19 AMP PRB: CPT | Performed by: EMERGENCY MEDICINE

## 2024-07-23 RX ORDER — FLUTICASONE PROPIONATE 50 MCG
1 SPRAY, SUSPENSION (ML) NASAL 2 TIMES DAILY PRN
Qty: 15 G | Refills: 0 | Status: SHIPPED | OUTPATIENT
Start: 2024-07-23

## 2024-07-23 RX ORDER — BENZONATATE 200 MG/1
200 CAPSULE ORAL 3 TIMES DAILY PRN
Qty: 30 CAPSULE | Refills: 0 | Status: SHIPPED | OUTPATIENT
Start: 2024-07-23 | End: 2024-08-02

## 2024-07-23 RX ORDER — CETIRIZINE HYDROCHLORIDE 10 MG/1
10 TABLET ORAL DAILY
Qty: 30 TABLET | Refills: 0 | Status: SHIPPED | OUTPATIENT
Start: 2024-07-23 | End: 2024-08-22

## 2024-07-23 NOTE — ED PROVIDER NOTES
Encounter Date: 7/23/2024       History     Chief Complaint   Patient presents with    Generalized Body Aches     Pt to ER with reports of generalized bodyaches, chills, cough, congestion. Pt reports + COVID exposure      A 34 year old male with no past medical history presents to the ED complaining of a sinus HA, rhinorrhea, and body aches since yesterday morning. He also complains of a cough with brown sputum since last night. Associated symptoms include subjective fever and chills. He has never had COVID before. He is not vaccinated for COVID. He has not taken any medications since symptom onset. Denies CP, SOB, N/V, syncope. NKDA.     The history is provided by the patient. No  was used.     Review of patient's allergies indicates:  No Known Allergies  History reviewed. No pertinent past medical history.  Past Surgical History:   Procedure Laterality Date    KNEE SURGERY Right     testicle surgery for torsion       Family History   Problem Relation Name Age of Onset    Asthma Mother      Diabetes Father      No Known Problems Sister      No Known Problems Brother      No Known Problems Brother      No Known Problems Brother      No Known Problems Brother      No Known Problems Son      No Known Problems Son      Allergic rhinitis Daughter       Social History     Tobacco Use    Smoking status: Never    Smokeless tobacco: Never   Substance Use Topics    Alcohol use: No    Drug use: Yes     Types: Marijuana     Review of Systems   Constitutional:  Positive for chills and fever (subjective).        Body aches   HENT:  Positive for rhinorrhea, sinus pressure and sinus pain. Negative for congestion, ear pain, postnasal drip, sore throat and trouble swallowing.    Eyes:  Negative for pain, discharge, redness and itching.   Respiratory:  Positive for cough (w/ brown sputum). Negative for shortness of breath.    Cardiovascular:  Negative for chest pain.   Gastrointestinal:  Negative for nausea and  vomiting.   Genitourinary:  Negative for dysuria.   Musculoskeletal:  Negative for back pain.   Skin:  Negative for rash.   Neurological:  Negative for syncope and weakness.   Hematological:  Does not bruise/bleed easily.       Physical Exam     Initial Vitals [07/23/24 0921]   BP Pulse Resp Temp SpO2   123/74 68 18 98.2 °F (36.8 °C) 96 %      MAP       --         Physical Exam    Nursing note and vitals reviewed.  Constitutional: He appears well-developed and well-nourished. He is not diaphoretic. No distress.   HENT:   Head: Normocephalic and atraumatic.   Right Ear: External ear normal.   Left Ear: External ear normal.   Eyes: Conjunctivae are normal.   Neck:   Normal range of motion.  Cardiovascular:  Normal rate, regular rhythm and intact distal pulses.           Pulmonary/Chest: Effort normal and breath sounds normal. No respiratory distress.   Abdominal: He exhibits no distension.   Musculoskeletal:      Cervical back: Normal range of motion.     Lymphadenopathy:        Head (left side): No tonsillar adenopathy present.     He has no cervical adenopathy.   Neurological: He is alert and oriented to person, place, and time. GCS score is 15. GCS eye subscore is 4. GCS verbal subscore is 5. GCS motor subscore is 6.   Skin: Skin is warm and dry.   Psychiatric: He has a normal mood and affect. His behavior is normal. Thought content normal.         ED Course   Procedures  Labs Reviewed   SARS-COV-2 RDRP GENE - Abnormal       Result Value    POC Rapid COVID Positive (*)      Acceptable Yes     POCT INFLUENZA A/B MOLECULAR    POC Molecular Influenza A Ag Negative      POC Molecular Influenza B Ag Negative       Acceptable Yes            Imaging Results    None          Medications - No data to display  Medical Decision Making  Encounter Date: 7/23/2024    A 34 year old male with no past medical history presents to the ED complaining of a sinus HA, rhinorrhea, and body aches since  yesterday morning. He also complains of a cough with brown sputum since last night. Associated symptoms include subjective fever and chills. He has never had COVID before. He is not vaccinated for COVID. He has not taken any medications since symptom onset. Denies CP, SOB, N/V, syncope. NKDA. .  Hemodynamically stable. Afebrile. Phonating and protecting the airway spontaneously. No clinical evidence for cardiovascular instability or impending airway compromise.  Remainder of physical exam as above. Notable for lungs are clear to auscultation.  Regular rate and rhythm.  No respiratory distress.  Patient is nontoxic appearing.  Prior medical records reviewed. PMD note reviewed. Current co-morbidities considered that will impact clinical decision making include as above.   Vitals range:   Temp:  [98.2 °F (36.8 °C)] 98.2 °F (36.8 °C)  Pulse:  [68] 68  Resp:  [18] 18  SpO2:  [96 %] 96 %  BP: (123)/(74) 123/74    Differential diagnoses includes but is not limited to:   COVID, flu, viral URI    ED workup initiated with viral swabs, which resulted positive for COVID.  Discussed symptomatic treatment vs treatment with Paxlovid at this time.  Patient prefers symptomatic treatment.  Prescriptions for Tessalon, Zyrtec and Flonase sent to pharmacy.  Strict ED return precautions discussed, otherwise follow up with primary care doctor.    Clinical picture today most consistent with COVID.   Doubt alternate pathology as listed in the differential above.    ED MEDS GIVEN:  Medications - No data to display    Clinical Impression:  Final diagnoses:  [U07.1] COVID-19 (Primary)         I see no indication of an emergent process beyond that addressed during our encounter but have duly counseled the patient/family regarding the need for prompt follow-up as well as the indications that should prompt immediate return to the emergency room should new or worrisome developments occur.  The patient/family has been provided with verbal and  printed direction regarding our final diagnosis(es) as well as instructions regarding use of OTC and/or Rx medications intended to manage the patient's conditions.   The patient/family communicates understanding of all this information and all remaining questions and concerns were addressed at this time.    DISCLAIMER: This note was prepared with [employer] (formerly known as [employer]) voice recognition transcription software. Garbled syntax, mangled pronouns, and other bizarre constructions may be attributed to that software system.      Amount and/or Complexity of Data Reviewed  Labs: ordered.    Risk  OTC drugs.  Prescription drug management.                                      Clinical Impression:  Final diagnoses:  [U07.1] COVID-19 (Primary)          ED Disposition Condition    Discharge Stable          ED Prescriptions       Medication Sig Dispense Start Date End Date Auth. Provider    benzonatate (TESSALON) 200 MG capsule Take 1 capsule (200 mg total) by mouth 3 (three) times daily as needed for Cough. 30 capsule 7/23/2024 8/2/2024 Magali Wiseman PA-C    fluticasone propionate (FLONASE) 50 mcg/actuation nasal spray 1 spray (50 mcg total) by Each Nostril route 2 (two) times daily as needed for Rhinitis. 15 g 7/23/2024 -- Magali Wiseman PA-C    cetirizine (ZYRTEC) 10 MG tablet Take 1 tablet (10 mg total) by mouth once daily. 30 tablet 7/23/2024 8/22/2024 Magali Wiseman PA-C          Follow-up Information       Follow up With Specialties Details Why Contact Info    Mauro Esquivel MD Internal Medicine Schedule an appointment as soon as possible for a visit in 1 week For follow up [address] [facility] [address]  [phone]      [facility] Emergency [facility] Emergency Medicine Go to  As needed, If symptoms worsen [address] [facility] [place] [address] [phone]             Magali Wiseman PA-C  07/23/24 8580       Magali Wiseman PA-C  07/23/24 2509

## 2024-07-23 NOTE — Clinical Note
"Rosemary"Ryland Aleman was seen and treated in our emergency department on 7/23/2024.     COVID-19 is present in our communities across the state. There is limited testing for COVID at this time, so not all patients can be tested. In this situation, your employee meets the following criteria:    Rosemary Aleman has met the criteria for COVID-19 testing and has a POSITIVE result. He can return to work once they are asymptomatic for 24 hours without the use of fever reducing medications AND at least five days from the first positive result. A mask is recommended for 5 days post quarantine.     If you have any questions or concerns, or if I can be of further assistance, please do not hesitate to contact me.    Sincerely,             Magali Wiseman PA-C"

## 2024-07-29 ENCOUNTER — HOSPITAL ENCOUNTER (EMERGENCY)
Facility: HOSPITAL | Age: 34
Discharge: HOME | End: 2024-07-29
Attending: EMERGENCY MEDICINE
Payer: COMMERCIAL

## 2024-07-29 VITALS
HEIGHT: 76 IN | TEMPERATURE: 98 F | BODY MASS INDEX: 21.31 KG/M2 | OXYGEN SATURATION: 98 % | DIASTOLIC BLOOD PRESSURE: 76 MMHG | WEIGHT: 175 LBS | RESPIRATION RATE: 18 BRPM | HEART RATE: 88 BPM | SYSTOLIC BLOOD PRESSURE: 133 MMHG

## 2024-07-29 DIAGNOSIS — U07.1 COVID-19: Primary | ICD-10-CM

## 2024-07-29 PROCEDURE — 99281 EMR DPT VST MAYX REQ PHY/QHP: CPT

## 2024-07-29 NOTE — DISCHARGE INSTRUCTIONS
You were seen in the emergency department today for work note.  Please take all medications as prescribed and as we discussed.  Follow-up with specialist if instructed to do so.  It is important to remember that some problems are difficult to diagnose and may not be found during your Emergency Department visit. Be sure to follow up with your primary care doctor and review all labs/imaging/tests that were performed during this visit with them. Some labs/tests may be outside of the normal range and require non-emergent follow-up and further investigation to help diagnose/exclude/prevent complications or other medical conditions. Return to the emergency department for any new or worsening symptoms. Thank you for allowing me to care for you today, it was my pleasure. I hope you get to feeling better soon!

## 2024-07-29 NOTE — Clinical Note
"Rosemary"Ryland Aleman was seen and treated in our emergency department on 7/29/2024.     COVID-19 is present in our communities across the state. There is limited testing for COVID at this time, so not all patients can be tested. In this situation, your employee meets the following criteria:    Rosemary Aleman has met the criteria for COVID-19 testing and has a POSITIVE result. He can return to work once they are asymptomatic for 24 hours without the use of fever reducing medications AND at least five days from the first positive result. A mask is recommended for 5 days post quarantine.     If you have any questions or concerns, or if I can be of further assistance, please do not hesitate to contact me.    Sincerely,             Aron Narvaez PA-C"

## 2024-07-29 NOTE — ED PROVIDER NOTES
Encounter Date: 7/29/2024       History     Chief Complaint   Patient presents with    Letter for School/Work     Pt to ER for work note to go back to due to + COVID test last week.      Patient is a 34-year-old male who presents to the emergency department for work note.  Reports he was diagnosed with COVID here on 07/23/2024.  Did not receive a work note.  Reports work will not let him return without a note.  He reports improvement in all symptoms.  No complaints today    The history is provided by the patient.     Review of patient's allergies indicates:  No Known Allergies  No past medical history on file.  Past Surgical History:   Procedure Laterality Date    KNEE SURGERY Right     testicle surgery for torsion       Family History   Problem Relation Name Age of Onset    Asthma Mother      Diabetes Father      No Known Problems Sister      No Known Problems Brother      No Known Problems Brother      No Known Problems Brother      No Known Problems Brother      No Known Problems Son      No Known Problems Son      Allergic rhinitis Daughter       Social History     Tobacco Use    Smoking status: Never    Smokeless tobacco: Never   Substance Use Topics    Alcohol use: No    Drug use: Yes     Types: Marijuana     Review of Systems   Constitutional:  Negative for chills and fever.   HENT:  Negative for congestion, ear pain, rhinorrhea, sore throat and trouble swallowing.    Respiratory:  Negative for cough and shortness of breath.    Cardiovascular:  Negative for chest pain.   Gastrointestinal:  Negative for abdominal pain, nausea and vomiting.   Musculoskeletal:  Negative for neck pain and neck stiffness.   Neurological:  Negative for headaches.       Physical Exam     Initial Vitals [07/29/24 1047]   BP Pulse Resp Temp SpO2   133/76 88 18 97.7 °F (36.5 °C) 98 %      MAP       --         Physical Exam    Nursing note and vitals reviewed.  Constitutional: He appears well-developed and well-nourished.   HENT:    Head: Normocephalic and atraumatic.   Right Ear: External ear normal.   Left Ear: External ear normal.   There is no posterior oropharyngeal erythema, no tonsillar swelling, no oropharyngeal exudates, uvula is midline. Normal dentition. No trismus.  No muffled voice. No submandibular swelling. Patient is tolerating secretions without difficulty.  Patient is speaking in full sentences on exam without difficulty.  Bilateral tympanic membranes are pearly gray without erythema, bulging, perforation.  There is no postauricular swelling, or overlying erythema or tenderness to palpation over mastoids bilaterally.    Neck: Carotid bruit is not present.   Normal range of motion.  Cardiovascular:  Normal rate, regular rhythm, normal heart sounds and intact distal pulses.     Exam reveals no gallop and no friction rub.       No murmur heard.  Pulmonary/Chest: Breath sounds normal. No respiratory distress. He has no wheezes. He has no rhonchi. He has no rales.   Abdominal: Abdomen is soft. Bowel sounds are normal. He exhibits no distension. There is no abdominal tenderness. There is no rebound and no guarding.   Musculoskeletal:         General: Normal range of motion.      Cervical back: Normal range of motion.     Neurological: He is alert and oriented to person, place, and time. GCS score is 15. GCS eye subscore is 4. GCS verbal subscore is 5. GCS motor subscore is 6.   Psychiatric: He has a normal mood and affect.         ED Course   Procedures  Labs Reviewed - No data to display       Imaging Results    None          Medications - No data to display  Medical Decision Making  This is an emergent evaluation of a 34-year-old male who presents to the emergency department for work note. COVID on 7/23/24.    Patient looks well clinically. There is no posterior oropharyngeal erythema, no tonsillar swelling, no oropharyngeal exudates, uvula is midline. Normal dentition. No trismus.  No muffled voice. No submandibular swelling.  Patient is tolerating secretions without difficulty.  Patient is speaking in full sentences on exam without difficulty.  Bilateral tympanic membranes are pearly gray without erythema, bulging, perforation.  There is no postauricular swelling, or overlying erythema or tenderness to palpation over mastoids bilaterally.  Regular rate rhythm without murmurs.  No carotid bruits appreciated on exam. Lungs are clear to auscultation bilaterally.  Abdomen is soft, nontender, non distended, with normal bowel sounds.       Vital signs, chart, labs, and/or imaging were all reviewed.  See ED course below and interpretations above.  Patient given work note.  Patient is very well appearing, and in no acute distress. Vital signs are reassuring here in the emergency department, patient is afebrile, breathing comfortable, satting 98 % on room air. Patient/Caregiver is stable for discharge at this time.  Patient/Caregiver was informed of results and plan of care. Patient/Caregiver verbalized understanding of care plan. All questions and concerns were addressed. Discussed strict return precautions with the patient/caregiver. Instructed follow up with primary care provider within 1 week.      Aron Narvaez PA-C    DISCLAIMER: This note was prepared with [employer] voice recognition transcription software. Garbled syntax, mangled pronouns, and other bizarre constructions may be attributed to that software system.                                         Clinical Impression:  Final diagnoses:  [U07.1] COVID-19 (Primary)          ED Disposition Condition    Discharge Stable          ED Prescriptions    None       Follow-up Information       Follow up With Specialties Details Why Contact [facility] Emergency Dept Emergency Medicine Go to  As needed, If symptoms worsen, or new symptoms develop [address] [facility] [place] [address] [phone]    Primary care doctor  Schedule an  appointment as soon as possible for a visit in 3 days               Aron Narvaez, SHANTANU  07/29/24 7157

## 2024-09-09 ENCOUNTER — PATIENT OUTREACH (OUTPATIENT)
Dept: ADMINISTRATIVE | Facility: HOSPITAL | Age: 34
End: 2024-09-09
Payer: COMMERCIAL

## 2024-09-13 PROBLEM — I73.00 RAYNAUD'S PHENOMENON WITHOUT GANGRENE: Status: ACTIVE | Noted: 2024-09-13

## 2024-10-07 ENCOUNTER — HOSPITAL ENCOUNTER (EMERGENCY)
Facility: HOSPITAL | Age: 34
Discharge: HOME | End: 2024-10-07
Attending: EMERGENCY MEDICINE
Payer: COMMERCIAL

## 2024-10-07 VITALS
BODY MASS INDEX: 21.91 KG/M2 | OXYGEN SATURATION: 100 % | DIASTOLIC BLOOD PRESSURE: 76 MMHG | HEART RATE: 70 BPM | RESPIRATION RATE: 18 BRPM | WEIGHT: 180 LBS | SYSTOLIC BLOOD PRESSURE: 123 MMHG | TEMPERATURE: 97 F

## 2024-10-07 DIAGNOSIS — L50.9 URTICARIA: Primary | ICD-10-CM

## 2024-10-07 PROCEDURE — 96372 THER/PROPH/DIAG INJ SC/IM: CPT

## 2024-10-07 PROCEDURE — 63600175 PHARM REV CODE 636 W HCPCS: Mod: ER

## 2024-10-07 PROCEDURE — 99284 EMERGENCY DEPT VISIT MOD MDM: CPT | Mod: 25,ER

## 2024-10-07 RX ORDER — PREDNISONE 10 MG/1
10 TABLET ORAL DAILY
Qty: 21 TABLET | Refills: 0 | Status: SHIPPED | OUTPATIENT
Start: 2024-10-07

## 2024-10-07 RX ORDER — FAMOTIDINE 20 MG/1
20 TABLET, FILM COATED ORAL 2 TIMES DAILY
Qty: 20 TABLET | Refills: 0 | Status: SHIPPED | OUTPATIENT
Start: 2024-10-07 | End: 2025-10-07

## 2024-10-07 RX ORDER — DIPHENHYDRAMINE HCL 25 MG
25 CAPSULE ORAL EVERY 6 HOURS PRN
Qty: 20 CAPSULE | Refills: 0 | Status: SHIPPED | OUTPATIENT
Start: 2024-10-07

## 2024-10-07 RX ORDER — DEXAMETHASONE SODIUM PHOSPHATE 4 MG/ML
10 INJECTION, SOLUTION INTRA-ARTICULAR; INTRALESIONAL; INTRAMUSCULAR; INTRAVENOUS; SOFT TISSUE
Status: COMPLETED | OUTPATIENT
Start: 2024-10-07 | End: 2024-10-07

## 2024-10-07 RX ADMIN — DEXAMETHASONE SODIUM PHOSPHATE 10 MG: 4 INJECTION INTRA-ARTICULAR; INTRALESIONAL; INTRAMUSCULAR; INTRAVENOUS; SOFT TISSUE at 05:10

## 2024-10-07 NOTE — ED PROVIDER NOTES
Encounter Date: 10/7/2024       History     Chief Complaint   Patient presents with    Rash     Rash with itching for over a week not relieved by benadryl     The history is provided by the patient and the spouse.   34-year-old male no pertinent past medical history presenting to the emergency department today for evaluation of hives to his lower extremities for almost 2 weeks.  States that 1 day at work he started noticing hives on the left and right foot which has spread up his legs and have been moving around his lower half of his body with the past few weeks.  States he has been taking Benadryl with some relief however after the Benadryl wears off the hives return.  Patient was unsure of any new exposures including no new detergent, soap, lotion, clothing, medication, food.  Patient was no other complaints at this time.  Denies any facial swelling, sore throat, itchy throat, trouble swallowing, nausea, vomiting, abdominal pain, facial swelling or other associated symptoms.  Review of patient's allergies indicates:  No Known Allergies  History reviewed. No pertinent past medical history.  Past Surgical History:   Procedure Laterality Date    KNEE SURGERY Right     testicle surgery for torsion       Family History   Problem Relation Name Age of Onset    Asthma Mother      Diabetes Father      No Known Problems Sister      No Known Problems Brother      No Known Problems Brother      No Known Problems Brother      No Known Problems Brother      No Known Problems Son      No Known Problems Son      Allergic rhinitis Daughter       Social History     Tobacco Use    Smoking status: Never    Smokeless tobacco: Never   Substance Use Topics    Alcohol use: No    Drug use: Yes     Types: Marijuana     Review of Systems   Constitutional:  Negative for chills, diaphoresis, fatigue and fever.   HENT:  Negative for congestion, ear discharge, ear pain, rhinorrhea, sore throat, trouble swallowing and voice change.    Eyes:   Negative for photophobia, redness and visual disturbance.   Respiratory:  Negative for cough and shortness of breath.    Cardiovascular:  Negative for chest pain, palpitations and leg swelling.   Gastrointestinal:  Negative for abdominal pain, diarrhea, nausea and vomiting.   Genitourinary:  Negative for difficulty urinating, dysuria, flank pain, frequency and hematuria.   Musculoskeletal:  Negative for arthralgias, back pain, myalgias, neck pain and neck stiffness.   Skin:  Positive for rash (hives). Negative for pallor.   Neurological:  Negative for dizziness, weakness, light-headedness, numbness and headaches.   Hematological:  Does not bruise/bleed easily.       Physical Exam     Initial Vitals [10/07/24 1710]   BP Pulse Resp Temp SpO2   123/76 70 18 97.2 °F (36.2 °C) 100 %      MAP       --         Physical Exam    Nursing note and vitals reviewed.  Constitutional: He appears well-developed and well-nourished. He is not diaphoretic. He does not appear ill. No distress.   HENT:   Head: Normocephalic and atraumatic.   Right Ear: External ear normal.   Left Ear: External ear normal.   Nose: Nose normal. Mouth/Throat: Uvula is midline, oropharynx is clear and moist and mucous membranes are normal.   No trismus normal jaw occlusion.  No oropharyngeal swelling.  Tongue midline.  Uvula midline.  No angioedema.  Tolerating oral secretions.  Speaking complete sentences.   Eyes: Conjunctivae and EOM are normal. Pupils are equal, round, and reactive to light. Right eye exhibits no discharge. Left eye exhibits no discharge. No scleral icterus.   Neck: Trachea normal. Neck supple.   Normal range of motion.   Full passive range of motion without pain.     Cardiovascular:  Normal rate, regular rhythm, normal heart sounds, intact distal pulses and normal pulses.     Exam reveals no distant heart sounds and no friction rub.       Pulmonary/Chest: Effort normal and breath sounds normal. No respiratory distress.   Abdominal:  Abdomen is soft. Bowel sounds are normal. He exhibits no distension and no pulsatile midline mass. There is no abdominal tenderness.   No right CVA tenderness.  No left CVA tenderness. There is no rebound and no guarding.   Musculoskeletal:         General: Normal range of motion.      Right shoulder: Normal.      Left shoulder: Normal.      Right elbow: Normal.      Left elbow: Normal.      Right wrist: Normal.      Left wrist: Normal.      Right hand: Normal.      Left hand: Normal.      Cervical back: Normal, full passive range of motion without pain, normal range of motion and neck supple.      Thoracic back: Normal.      Lumbar back: Normal.      Right hip: Normal.      Left hip: Normal.      Right knee: Normal.      Left knee: Normal.      Right lower leg: Normal.      Left lower leg: Normal.      Right ankle: Normal.      Left ankle: Normal.      Right foot: Normal.      Left foot: Normal.     Neurological: He is alert and oriented to person, place, and time. He has normal strength. No cranial nerve deficit or sensory deficit. Coordination and gait normal.   Skin: Skin is warm and dry. Capillary refill takes less than 2 seconds. Rash noted. No bruising and no ecchymosis noted. Rash is urticarial. No erythema.   Urticarial wheals located throughout the lower extremities on the buttocks bilateral thighs bilateral calves.  Blanching nontender negative Nikolsky.   Psychiatric: He has a normal mood and affect. His speech is normal and behavior is normal. Thought content normal.         ED Course   Procedures  Labs Reviewed - No data to display       Imaging Results    None          Medications   dexAMETHasone injection 10 mg (10 mg Intramuscular Given 10/7/24 6245)     Medical Decision Making  34-year-old male no pertinent past medical history presenting to the emergency department today for evaluation of hives to his lower extremities for almost 2 weeks.  States that 1 day at work he started noticing hives on the  left and right foot which has spread up his legs and have been moving around his lower half of his body with the past few weeks.  States he has been taking Benadryl with some relief however after the Benadryl wears off the hives return.  Patient was unsure of any new exposures including no new detergent, soap, lotion, clothing, medication, food.  Patient was no other complaints at this time.  Denies any facial swelling, sore throat, itchy throat, trouble swallowing, nausea, vomiting, abdominal pain, facial swelling or other associated symptoms.  Patient's chart and medical history reviewed.  Patient's vitals reviewed.  They are afebrile, no respiratory distress, nontoxic-appearing in the ED.  Differential diagnosis considered anaphylaxis, Urticaria, Contact dermatitis, Chicken pox, Hand foot mouth disease, Herpes zoster, Impetigo, Pemphigus vulgaris, Bullous pemphigoid, SJS/TEN, cellulitis, erysipelas, mononucleosis, scarlet fever, drug-induced rash, other rash.   Physical exam as noted above. Duration of symptoms of hives localized to lower extremities with no findings of complaints concerning for anaphylaxis. Will treat with decadron injection and dc with pred taper with allergist follow up for allergen identification if symptoms persist.    At this time I'll discharge home to follow up with primary care physician in the next 1-2 days for further evaluation.  If the pain continues the pt will need to see allergist. for further evaluation.  The patient is comfortable with this plan and comfortable going home at this time. After taking into careful account the historical factors and physical exam findings of the patient's presentation today, in conjunction with the empirical and objective data obtained on ED workup, no acute emergent medical condition has been identified. The patient appears to be low risk for an emergent medical condition and I feel it is safe and appropriate at this time for the patient to be  discharged to follow-up as detailed in their discharge instructions for reevaluation and possible continued outpatient workup and management. I have discussed the specifics of the workup with the patient and the patient has verbalized understanding of the details of the workup, the diagnosis, the treatment plan, and the need for outpatient follow-up.  Although the patient has no emergent etiology today this does not preclude the development of an emergent condition so in addition, I have advised the patient that they can return to the ED and/or activate EMS at any time with worsening of their symptoms, change of their symptoms, or with any other medical complaint.  The patient remained comfortable and stable during their visit in the ED.  Discharge and follow-up instructions discussed with the patient who expressed understanding and willingness to comply with my recommendations. I discussed with the patient/family the diagnosis, treatment plan, indications for return to the emergency department, and for expected follow-up. Please follow up with your primary doctor in 1-2 days and return to the ED in any new, worsening, or continued symptoms. The patient/family verbalized an understanding. The patient/family is asked if there are any questions or concerns. We discuss the case, until all issues are addressed to the patient/family's satisfaction. Patient/family understands and is agreeable to the plan.      SANJEEV BELLA PA-C.    DISCLAIMER: This note was prepared with tok tok tok voice recognition transcription software. Garbled syntax, mangled pronouns, and other bizarre constructions may be attributed to that software system.      Risk  OTC drugs.  Prescription drug management.                                      Clinical Impression:  Final diagnoses:  [L50.9] Urticaria (Primary)          ED Disposition Condition    Discharge Stable          ED Prescriptions       Medication Sig Dispense Start Date End Date Auth.  Provider    predniSONE (DELTASONE) 10 MG tablet Take 1 tablet (10 mg total) by mouth once daily. Take 4 tabs x 3 days, then take 2 tabs x 3 days, then take 1 tab x 3 days. 21 tablet 10/7/2024 -- Himanshu Nuñez PA-C    diphenhydrAMINE (BENADRYL) 25 mg capsule Take 1 capsule (25 mg total) by mouth every 6 (six) hours as needed for Itching or Allergies. 20 capsule 10/7/2024 -- Himanshu Nuñez PA-C    famotidine (PEPCID) 20 MG tablet Take 1 tablet (20 mg total) by mouth 2 (two) times daily. 20 tablet 10/7/2024 10/7/2025 Himanshu Nuñez PA-C          Follow-up Information       Follow up With Specialties Details Why Contact Info    Mauro Esquivel MD Internal Medicine Schedule an appointment as soon as possible for a visit in 1 day for follow up [address] [facility]  [place] [address]  [phone]               Himanshu Nuñez PA-C  10/07/24 1010

## 2024-10-07 NOTE — ED TRIAGE NOTES
Rosemary Aleman, a 34 y.o. male presents to the ED w/ complaint of itching and hives that started on his feet a week ago and then went up his legs and now has couple on his fingers, behind his ear and on his buttock.  He denies any sob, itching to mouth or throat. Has been taking Benadryl but it only helps the itching and then it returns.    Triage note:  Chief Complaint   Patient presents with    Rash     Rash with itching for over a week not relieved by benadryl     Review of patient's allergies indicates:  No Known Allergies  History reviewed. No pertinent past medical history.

## 2024-10-07 NOTE — DISCHARGE INSTRUCTIONS
Thank you for coming to our Emergency Department today. It is important to remember that some problems or medical conditions are difficult to diagnose and may not be found or addressed during your Emergency Department visit.  These conditions often start with non-specific symptoms and can only be diagnosed on follow up visits with your primary care physician or specialist when the symptoms continue or change. Please remember that all medical conditions can change, and we cannot predict how you will be feeling tomorrow or the next day. Return to the ER with any questions/concerns, new/concerning symptoms including fever, chest pain, shortness of breath, loss of consciousness, dizziness, weakness, worsening symptoms, failure to improve, or any other concerns. Also, please follow up with your Primary Care Physician and/or Pediatrician in the next 1-2 days to review your ED visit in entirety and for re-evaluation.   Be sure to follow up with your primary care doctor and review all labs/imaging/tests that were performed during your ER visit with them. It is very common for us to identify non-emergent incidental findings which must be followed up with your primary care physician.  Some labs/imaging/tests may be outside of the normal range, and require non-emergent follow-up and/or further investigation/treatment/procedures/testing to help diagnose/exclude/prevent complications or other potentially serious medical conditions. Some abnormalities may not have been discussed or addressed during your ER visit. Some lab results may not return during your ER visit but can be accessible by downloading the free [facility] Mychart jake or by visiting [email or web address] . It is important for you to review all labs/imaging/tests which are outside of the normal range with your physician.  An ER visit does not replace a primary care visit, and many screening tests or follow-up tests cannot be ordered by an ER doctor or performed by  the ER. Some tests may even require pre-approval.  If you do not have a primary care doctor, you may contact the one listed on your discharge paperwork or you may also call the [facility] Appointment Desk at [phone] , or [address] [place] [address] at  [phone] to schedule an appointment, or establish care with a primary care doctor or even a specialist and to obtain information about local resources. It is important to your health that you have a primary care doctor.  Please take all medications as directed. We have done our best to select a medication for you that will treat your condition however, all medications may potentially have side-effects and it is impossible to predict which medications may give you side-effects or what those side-effects (if any) those medications may give you.  If you feel that you are having a negative effect or side-effect of any medication you should stop taking those medications immediately and seek medical attention. If you feel that you are having a life-threatening reaction call 911.  Do not drive, swim, climb to height, take a bath, operate heavy machinery, drink alcohol or take potentially sedating medications, sign any legal documents or make any important decisions for 24 hours if you have received any pain medications, sedatives or mood altering drugs during your ER visit or within 24 hours of taking them if they have been prescribed to you.   You can find additional resources for Dentists, hearing aids, durable medical equipment, low cost pharmacies and other resources at [email or web address]  Patient agrees with this plan. Discussed with her strict return precautions, they verbalized understanding. Patient is stable for discharge.   § Please take all medication as prescribed.

## 2024-10-30 ENCOUNTER — HOSPITAL ENCOUNTER (EMERGENCY)
Facility: HOSPITAL | Age: 34
Discharge: HOME | End: 2024-10-30
Attending: STUDENT IN AN ORGANIZED HEALTH CARE EDUCATION/TRAINING PROGRAM
Payer: COMMERCIAL

## 2024-10-30 VITALS
OXYGEN SATURATION: 100 % | RESPIRATION RATE: 18 BRPM | HEART RATE: 99 BPM | BODY MASS INDEX: 20.69 KG/M2 | WEIGHT: 170 LBS | TEMPERATURE: 99 F | SYSTOLIC BLOOD PRESSURE: 147 MMHG | DIASTOLIC BLOOD PRESSURE: 93 MMHG

## 2024-10-30 DIAGNOSIS — T78.40XA ALLERGIC REACTION, INITIAL ENCOUNTER: Primary | ICD-10-CM

## 2024-10-30 PROCEDURE — 63600175 PHARM REV CODE 636 W HCPCS: Performed by: NURSE PRACTITIONER

## 2024-10-30 PROCEDURE — 25000003 PHARM REV CODE 250: Performed by: NURSE PRACTITIONER

## 2024-10-30 PROCEDURE — 86160 COMPLEMENT ANTIGEN: CPT | Performed by: NURSE PRACTITIONER

## 2024-10-30 PROCEDURE — 99283 EMERGENCY DEPT VISIT LOW MDM: CPT

## 2024-10-30 RX ORDER — PREDNISONE 20 MG/1
40 TABLET ORAL
Status: COMPLETED | OUTPATIENT
Start: 2024-10-30 | End: 2024-10-30

## 2024-10-30 RX ORDER — CETIRIZINE HYDROCHLORIDE 10 MG/1
10 TABLET ORAL
Status: COMPLETED | OUTPATIENT
Start: 2024-10-30 | End: 2024-10-30

## 2024-10-30 RX ORDER — FAMOTIDINE 20 MG/1
40 TABLET, FILM COATED ORAL
Status: COMPLETED | OUTPATIENT
Start: 2024-10-30 | End: 2024-10-30

## 2024-10-30 RX ORDER — PREDNISONE 20 MG/1
40 TABLET ORAL DAILY
Qty: 8 TABLET | Refills: 0 | Status: SHIPPED | OUTPATIENT
Start: 2024-10-31 | End: 2024-11-04

## 2024-10-30 RX ADMIN — FAMOTIDINE 40 MG: 20 TABLET ORAL at 03:10

## 2024-10-30 RX ADMIN — PREDNISONE 40 MG: 20 TABLET ORAL at 03:10

## 2024-10-30 RX ADMIN — CETIRIZINE HYDROCHLORIDE 10 MG: 10 TABLET, FILM COATED ORAL at 03:10

## 2024-10-30 NOTE — DISCHARGE INSTRUCTIONS
Steroid as ordered.  Benadryl or zyrtec plus famotidine over the counter as directed on package x7 days.  You have been given a medication that causes drowsiness.  Do not operate motor vehicles, drink alcohol, or operate heavy machinery while taking this medication.   Return to the Emergency Department for any worsening, change in condition, or any emergent concerns.

## 2024-10-30 NOTE — ED PROVIDER NOTES
Encounter Date: 10/30/2024       History     Chief Complaint   Patient presents with    Oral Swelling     Pt reports swelling to right side of his top lip since this morning. Pt reports he had swelling to his left ear and hives to his back a week ago which he took benadryl for and it went away. Pt denies new medications, detergents or body care products. Pt denies throat involvement or trouble breathing.     Chief complaint: Lip swelling     History of present illness: Patient is a 34-year-old male who reports having had multiple allergic reactions symptoms recently including hand and foot swelling rashes that are itchy an eye swelling yesterday stay he reports he woke up and his right upper lip was swollen.  He denies any difficulty breathing or swallowing.  He took only Benadryl without relief at 08:30.  He denies shortness of breath diarrhea nausea vomiting or other rashes at this time.  He has been treated 1 week ago with steroids.    The history is provided by the patient. No  was used.     Review of patient's allergies indicates:  No Known Allergies  History reviewed. No pertinent past medical history.  Past Surgical History:   Procedure Laterality Date    KNEE SURGERY Right     testicle surgery for torsion       Family History   Problem Relation Name Age of Onset    Asthma Mother      Diabetes Father      No Known Problems Sister      No Known Problems Brother      No Known Problems Brother      No Known Problems Brother      No Known Problems Brother      No Known Problems Son      No Known Problems Son      Allergic rhinitis Daughter       Social History     Tobacco Use    Smoking status: Never    Smokeless tobacco: Never   Substance Use Topics    Alcohol use: No    Drug use: Yes     Types: Marijuana     Review of Systems   Skin:         Swelling of the right upper lip       Physical Exam     Initial Vitals [10/30/24 1421]   BP Pulse Resp Temp SpO2   (!) 147/93 99 18 98.5 °F (36.9 °C)  100 %      MAP       --         Physical Exam    Nursing note and vitals reviewed.  Constitutional: He appears well-developed and well-nourished. He is not diaphoretic. No distress.   HENT:   Head: Normocephalic and atraumatic.   Right Ear: External ear normal.   Left Ear: External ear normal.   Nose: Nose normal.   Eyes: Pupils are equal, round, and reactive to light. Right eye exhibits no discharge. Left eye exhibits no discharge. No scleral icterus.   Neck:   Normal range of motion.  Pulmonary/Chest: No respiratory distress.   Abdominal: He exhibits no distension.   Musculoskeletal:         General: Normal range of motion.      Cervical back: Normal range of motion.     Neurological: He is alert and oriented to person, place, and time.   Skin: Skin is dry. Capillary refill takes less than 2 seconds.              ED Course   Procedures  Labs Reviewed   C4 COMPLEMENT          Imaging Results    None          Medications   cetirizine tablet 10 mg (10 mg Oral Given 10/30/24 1516)   predniSONE tablet 40 mg (40 mg Oral Given 10/30/24 1516)   famotidine tablet 40 mg (40 mg Oral Given 10/30/24 1516)     Medical Decision Making  Patient is a 34-year-old male who reports having had multiple allergic reactions symptoms recently including hand and foot swelling rashes that are itchy an eye swelling yesterday stay he reports he woke up and his right upper lip was swollen.  He denies any difficulty breathing or swallowing.  He took only Benadryl without relief at 08:30.  He denies shortness of breath diarrhea nausea vomiting or other rashes at this time.  He has been treated 1 week ago with steroids.    On physical exam the patient is afebrile nontoxic in no apparent distress breath sounds are clear to auscultation there is no swelling of the mouth or tongue.  He is tolerating secretions in his airway is patent.      Differential diagnosis includes allergic reaction anaphylaxis angioedema hereditary angioedema    Problems  Addressed:  Allergic reaction, initial encounter: acute illness or injury     Details: Antihistamines and steroids 1st dose given in the ER.  Continue for 1 week.  Follow-up with Allergy Asthma service.    Amount and/or Complexity of Data Reviewed  Discussion of management or test interpretation with external provider(s): Vital signs at the time of disposition were:  BP (!) 147/93 (BP Location: Right arm)   Pulse 99   Temp 98.5 °F (36.9 °C) (Oral)   Resp 18   Wt 77.1 kg (170 lb)   SpO2 100%   BMI 20.69 kg/m²       See AVS for additional recommendations. Medications listed herein were prescribed after reviewing the patient's allergies, medication list, history, most recent laboratories as available.  Referrals below were provided after reviewing the patient's previous medical providers. He understands he  should return for any worsening or changes in condition.  Prior to discharge the patient was asked if he  had any additional concerns or complaints and he declined. The patient was given an opportunity to ask questions and all were answered to his satisfaction.     Risk  OTC drugs.  Prescription drug management.  Diagnosis or treatment significantly limited by social determinants of health.  Risk Details: I have discussed this patient with Dr. Kerr and she concurs with my diagnostic and treatment plan.                  ED Course as of 10/30/24 1808   Wed Oct 30, 2024   1456 BP(!): 147/93 [VC]   1456 Temp: 98.5 °F (36.9 °C) [VC]   1456 Temp Source: Oral [VC]   1456 Pulse: 99 [VC]   1456 Resp: 18 [VC]   1456 SpO2: 100 % [VC]      ED Course User Index  [VC] Geovany Iraheta DNP                           Clinical Impression:  Final diagnoses:  [T78.40XA] Allergic reaction, initial encounter (Primary)          ED Disposition Condition    Discharge Stable          ED Prescriptions       Medication Sig Dispense Start Date End Date Auth. Provider    predniSONE (DELTASONE) 20 MG tablet Take 2 tablets (40 mg total)  by mouth once daily. for 4 days 8 tablet 10/31/2024 11/4/2024 Geovany Iraheta DNP          Follow-up Information       Follow up With Specialties Details Why Contact Info    Mauro Esquivel MD Internal Medicine Schedule an appointment as soon as possible for a visit   [address] [facility]  [place] [address]  [phone]               Geovany Iraheta DNP  10/30/24 4746

## 2024-10-30 NOTE — FIRST PROVIDER EVALUATION
Medical screening examination initiated.  I have conducted a focused provider triage encounter, findings are as follows:    Brief history of present illness:  lip swelling and itchy rash.  Pt seen last wk and treated for allergic rxn.  Denies new medications.  No BP meds    There were no vitals filed for this visit.    Pertinent physical exam:  Posterior pharynx negative.  No intraoral swelling.  Upper lip with significant edema.    Brief workup plan:  FT    Preliminary workup initiated; this workup will be continued and followed by the physician or advanced practice provider that is assigned to the patient when roomed.

## 2024-10-31 LAB — C4 SERPL-MCNC: 16 MG/DL (ref 11–44)

## 2024-11-04 ENCOUNTER — HOSPITAL ENCOUNTER (OUTPATIENT)
Dept: RADIOLOGY | Facility: HOSPITAL | Age: 34
Discharge: HOME | End: 2024-11-04
Payer: COMMERCIAL

## 2024-11-04 ENCOUNTER — OFFICE VISIT (OUTPATIENT)
Dept: FAMILY MEDICINE | Facility: CLINIC | Age: 34
End: 2024-11-04
Payer: COMMERCIAL

## 2024-11-04 VITALS
OXYGEN SATURATION: 95 % | WEIGHT: 175.69 LBS | SYSTOLIC BLOOD PRESSURE: 114 MMHG | BODY MASS INDEX: 21.39 KG/M2 | HEIGHT: 76 IN | TEMPERATURE: 99 F | HEART RATE: 71 BPM | DIASTOLIC BLOOD PRESSURE: 72 MMHG

## 2024-11-04 DIAGNOSIS — R05.2 SUBACUTE COUGH: ICD-10-CM

## 2024-11-04 DIAGNOSIS — J30.1 NON-SEASONAL ALLERGIC RHINITIS DUE TO POLLEN: ICD-10-CM

## 2024-11-04 DIAGNOSIS — K62.5 BRIGHT RED BLOOD PER RECTUM: ICD-10-CM

## 2024-11-04 DIAGNOSIS — T78.40XD ALLERGIC REACTION, SUBSEQUENT ENCOUNTER: Primary | ICD-10-CM

## 2024-11-04 PROCEDURE — 99999 PR PBB SHADOW E&M-EST. PATIENT-LVL V: CPT | Mod: PBBFAC,,,

## 2024-11-04 PROCEDURE — 99214 OFFICE O/P EST MOD 30 MIN: CPT | Mod: S$GLB,,,

## 2024-11-04 RX ORDER — POLYETHYLENE GLYCOL 3350 17 G/17G
17 POWDER, FOR SOLUTION ORAL DAILY
Qty: 289 G | Refills: 0 | Status: SHIPPED | OUTPATIENT
Start: 2024-11-04 | End: 2024-11-21

## 2024-11-04 RX ORDER — BENZONATATE 100 MG/1
100 CAPSULE ORAL 3 TIMES DAILY PRN
Qty: 30 CAPSULE | Refills: 0 | Status: SHIPPED | OUTPATIENT
Start: 2024-11-04 | End: 2024-11-14

## 2024-11-04 RX ORDER — FLUTICASONE PROPIONATE 50 MCG
1 SPRAY, SUSPENSION (ML) NASAL 2 TIMES DAILY PRN
Qty: 15 G | Refills: 0 | Status: SHIPPED | OUTPATIENT
Start: 2024-11-04

## 2024-11-04 RX ORDER — FAMOTIDINE 20 MG/1
20 TABLET, FILM COATED ORAL 2 TIMES DAILY
Qty: 20 TABLET | Refills: 0 | Status: SHIPPED | OUTPATIENT
Start: 2024-11-04 | End: 2025-11-04

## 2024-11-04 RX ORDER — DOCUSATE SODIUM 100 MG/1
100 CAPSULE, LIQUID FILLED ORAL DAILY
Qty: 30 CAPSULE | Refills: 3 | Status: SHIPPED | OUTPATIENT
Start: 2024-11-04

## 2024-11-04 RX ORDER — HYDROCORTISONE 25 MG/G
CREAM TOPICAL 2 TIMES DAILY PRN
Qty: 28 G | Refills: 1 | Status: SHIPPED | OUTPATIENT
Start: 2024-11-04

## 2024-11-04 RX ORDER — CETIRIZINE HYDROCHLORIDE 10 MG/1
10 TABLET ORAL DAILY
Qty: 30 TABLET | Refills: 0 | Status: SHIPPED | OUTPATIENT
Start: 2024-11-04 | End: 2024-12-04

## 2024-11-04 NOTE — PROGRESS NOTES
HPI     Rosemary Aleman is a 34 y.o. male with multiple medical diagnoses as listed in the medical history and problem list that presents for follow up on recent ED visit for hives, rash.   Chief Complaint   Patient presents with    Annual Exam     Breaking out into a rashes every day for about 1 month.     Has been breaking out in hives, rash intermittently for about a month. No known triggers. No new medications or foods. Denies new laundry detergent, soap, lotion. Denies CP/SOB. Has also been coughing up mucous for about two weeks, producing brown/greenish phlegm.     Significant other notices patient has fast heart rate while sleeping. Patient denies any palpitations, shortness of breath, anxiety. HRR on exam.    Has noticed bright red blood on toilet paper several times recently. Endorses straining to have BM. No pain. Boil on rectum about a year ago that was treated in ED, now healed.     Urticaria  This is a recurrent problem. The current episode started 1 to 4 weeks ago. The problem has been resolved since onset. The affected locations include the abdomen, chest, torso and back. The rash is characterized by itchiness and redness. He was exposed to nothing. Associated symptoms include congestion, coughing and rhinorrhea. Pertinent negatives include no diarrhea, facial edema, fatigue, fever, shortness of breath, sore throat or vomiting. Past treatments include antihistamine and oral steroids. The treatment provided significant relief. His past medical history is significant for allergies (seasonal) and eczema. There is no history of asthma.           See note below from recent ED visit on 10/30/24:    Expand All Collapse All  Encounter Date: 10/30/2024        History           Chief Complaint   Patient presents with    Oral Swelling       Pt reports swelling to right side of his top lip since this morning. Pt reports he had swelling to his left ear and hives to his back a week ago which he took benadryl  for and it went away. Pt denies new medications, detergents or body care products. Pt denies throat involvement or trouble breathing.      Chief complaint: Lip swelling      History of present illness: Patient is a 34-year-old male who reports having had multiple allergic reactions symptoms recently including hand and foot swelling rashes that are itchy an eye swelling yesterday stay he reports he woke up and his right upper lip was swollen.  He denies any difficulty breathing or swallowing.  He took only Benadryl without relief at 08:30.  He denies shortness of breath diarrhea nausea vomiting or other rashes at this time.  He has been treated 1 week ago with steroids.     The history is provided by the patient. No  was used.      Review of patient's allergies indicates:  No Known Allergies  History reviewed. No pertinent past medical history.        Past Surgical History:   Procedure Laterality Date    KNEE SURGERY Right      testicle surgery for torsion                 Family History   Problem Relation Name Age of Onset    Asthma Mother        Diabetes Father        No Known Problems Sister        No Known Problems Brother        No Known Problems Brother        No Known Problems Brother        No Known Problems Brother        No Known Problems Son        No Known Problems Son        Allergic rhinitis Daughter          Social History   Social History            Tobacco Use    Smoking status: Never    Smokeless tobacco: Never   Substance Use Topics    Alcohol use: No    Drug use: Yes       Types: Marijuana         Review of Systems   Skin:         Swelling of the right upper lip         Physical Exam             Initial Vitals [10/30/24 1421]   BP Pulse Resp Temp SpO2   (!) 147/93 99 18 98.5 °F (36.9 °C) 100 %       MAP           --              Physical Exam     Nursing note and vitals reviewed.  Constitutional: He appears well-developed and well-nourished. He is not diaphoretic. No distress.    HENT:   Head: Normocephalic and atraumatic.   Right Ear: External ear normal.   Left Ear: External ear normal.   Nose: Nose normal.   Eyes: Pupils are equal, round, and reactive to light. Right eye exhibits no discharge. Left eye exhibits no discharge. No scleral icterus.   Neck:   Normal range of motion.  Pulmonary/Chest: No respiratory distress.   Abdominal: He exhibits no distension.   Musculoskeletal:         General: Normal range of motion.      Cervical back: Normal range of motion.      Neurological: He is alert and oriented to person, place, and time.   Skin: Skin is dry. Capillary refill takes less than 2 seconds.                  ED Course   Procedures  Labs Reviewed   C4 COMPLEMENT            Imaging Results    None            Medications   cetirizine tablet 10 mg (10 mg Oral Given 10/30/24 1516)   predniSONE tablet 40 mg (40 mg Oral Given 10/30/24 1516)   famotidine tablet 40 mg (40 mg Oral Given 10/30/24 1516)      Medical Decision Making  Patient is a 34-year-old male who reports having had multiple allergic reactions symptoms recently including hand and foot swelling rashes that are itchy an eye swelling yesterday stay he reports he woke up and his right upper lip was swollen.  He denies any difficulty breathing or swallowing.  He took only Benadryl without relief at 08:30.  He denies shortness of breath diarrhea nausea vomiting or other rashes at this time.  He has been treated 1 week ago with steroids.     On physical exam the patient is afebrile nontoxic in no apparent distress breath sounds are clear to auscultation there is no swelling of the mouth or tongue.  He is tolerating secretions in his airway is patent.       Differential diagnosis includes allergic reaction anaphylaxis angioedema hereditary angioedema     Problems Addressed:  Allergic reaction, initial encounter: acute illness or injury     Details: Antihistamines and steroids 1st dose given in the ER.  Continue for 1 week.  Follow-up  "with Allergy Asthma service.     Amount and/or Complexity of Data Reviewed  Discussion of management or test interpretation with external provider(s): Vital signs at the time of disposition were:  BP (!) 147/93 (BP Location: Right arm)   Pulse 99   Temp 98.5 °F (36.9 °C) (Oral)   Resp 18   Wt 77.1 kg (170 lb)   SpO2 100%   BMI 20.69 kg/m²         See AVS for additional recommendations. Medications listed herein were prescribed after reviewing the patient's allergies, medication list, history, most recent laboratories as available.  Referrals below were provided after reviewing the patient's previous medical providers. He understands he  should return for any worsening or changes in condition.  Prior to discharge the patient was asked if he  had any additional concerns or complaints and he declined. The patient was given an opportunity to ask questions and all were answered to his satisfaction.      Risk  OTC drugs.  Prescription drug management.  Diagnosis or treatment significantly limited by social determinants of health.  Risk Details: I have discussed this patient with Dr. Kerr and she concurs with my diagnostic and treatment plan.                   Social Factors  Tobacco use: smokes marijuana daily    Alcohol: none   Intimate partner violence screening  "Do you feel safe in your current relationship?" Yes  "Have you ever been in a relationship in which your partner frightened you or hurt you?" No  Living Will/POA: No   Regular Exercise: Walks a lot.     Depression  Over the past two weeks, have you felt down, depressed, or hopeless? No  Over the past two weeks, have you felt little interest or pleasure in doing things? No    Reproductive Health  STD screening in last year: declined  HIV screening: reviewed     Screen for Chronic Disease  The ASCVD Risk score (Patricia BUCHANAN, et al., 2019) failed to calculate for the following reasons:    The 2019 ASCVD risk score is only valid for ages 40 to 79 " "    Dyslipidemia screening needed: reviewed   T2DM screening needed: reviewed   Colonoscopy needed: reviewed   PSA needed: reviewed   AAA screening needed:reviewed     CHD Risk Factors: male gender and smoking/ tobacco exposure    Screen men 35 years and older, and men 20 to 34 years of age who have cardiovascular risk factors for dyslipidemia  Begin screening colonoscopies at 50 years of age in men of average risk, and continue until 75 years of age; offer fecal occult blood testing every year, flexible sigmoidoscopy every five years combined with fecal occult blood testing every three years, or colonoscopy every 10 years   The American Urological Association recommends offering PSA testing and digital rectal examination to well-informed men beginning at 40 years of age and continuing until life expectancy is less than 10 years  Screen once with ultrasonography in men 65 to 75 years of age if they have a family history or have smoked at [id number] cigarettes in their lifetime  Screen men with a sustained blood pressure greater than 135/80 mm Hg for T2DM    Immunizations  Reviewed      PHYSICAL EXAM  VITAL SIGNS: /72 (BP Location: Right arm, Patient Position: Sitting)   Pulse 71   Temp 98.6 °F (37 °C) (Oral)   Ht 6' 4" (1.93 m)   Wt 79.7 kg (175 lb 11.3 oz)   SpO2 95%   BMI 21.39 kg/m²       Assessment & Plan     1. Allergic reaction, subsequent encounter    No hives or swelling noted upon assessment today. Patient has been taking zyrtec daily and benedryl once or twice a day PRN. Completed course of PO steroids prescribed in ED. Will refill PO zyrtec, pepcid, and flonase. Continue with benedryl 25mg PRN q 6 hours. Follow up appointment with Allergy/Immunology not scheduled until March, phone number given for referral desk to assist patient in obtaining earlier appointment.   Follow up with clinic for any worsening symptoms, or if symptoms fail to improve. ED precautions given.       - Ambulatory " referral/consult to Dermatology; Future  - cetirizine (ZYRTEC) 10 MG tablet; Take 1 tablet (10 mg total) by mouth once daily.  Dispense: 30 tablet; Refill: 0  - famotidine (PEPCID) 20 MG tablet; Take 1 tablet (20 mg total) by mouth 2 (two) times daily.  Dispense: 20 tablet; Refill: 0  - fluticasone propionate (FLONASE) 50 mcg/actuation nasal spray; 1 spray (50 mcg total) by Each Nostril route 2 (two) times daily as needed for Rhinitis.  Dispense: 15 g; Refill: 0    2. Bright red blood per rectum    Has noticed bright red blood on toilet paper with wiping a few times recently. Notes straining with passing BM. Denies pain. Had boil to rectum last year that was treated in the ED and is healed. Encouraged increase water intake, increase dietary fiber intake. Colace and miralax daily for soft stools. Anusol BID PRN for hemorrhoids. Has upcoming lab work scheduled (CBC, CMP). Follow up with clinic for any worsening symptoms, or if symptoms fail to improve. ED precautions given.      - docusate sodium (COLACE) 100 MG capsule; Take 1 capsule (100 mg total) by mouth once daily.  Dispense: 30 capsule; Refill: 3  - polyethylene glycol (GLYCOLAX) 17 gram/dose powder; Take 17 g by mouth once daily. for 17 days  Dispense: 289 g; Refill: 0  - hydrocortisone (ANUSOL-HC) 2.5 % rectal cream; Place rectally 2 (two) times daily as needed for Hemorrhoids.  Dispense: 28 g; Refill: 1    3. Subacute cough    Has had productive cough for over two weeks. Denies chest pain, shortness of breath. No fever. History of allergic rhinitis, postnasal drip. Noted with PND upon exam today. Lungs clear to auscultation bilaterally. Will order chest xray due to duration of symptoms. Will refill medication for allergic rhinitis, and order PRN tessalon for cough.     - X-Ray Chest PA And Lateral; Future  - benzonatate (TESSALON) 100 MG capsule; Take 1 capsule (100 mg total) by mouth 3 (three) times daily as needed for Cough.  Dispense: 30 capsule; Refill:  0    4. Non-seasonal allergic rhinitis due to pollen    Stable on flonase daily. The current medical regimen is effective;  continue present plan and medications.    - fluticasone propionate (FLONASE) 50 mcg/actuation nasal spray; 1 spray (50 mcg total) by Each Nostril route 2 (two) times daily as needed for Rhinitis.  Dispense: 15 g; Refill: 0      Discussed DDx, condition, and treatment.   Education sent to patient portal/included in after visit summary.  ED precautions given.   Notify provider if symptoms do not resolve or increase in severity.   Patient verbalizes understanding and agrees with plan of care.      --------------------------------------------      Health Maintenance:  Health Maintenance         Date Due Completion Date    Influenza Vaccine (1) Never done ---    COVID-19 Vaccine (1 - 2024-25 season) Never done ---    Lipid Panel 09/14/2027 9/14/2022    TETANUS VACCINE 10/27/2027 10/27/2017    RSV Vaccine (Age 60+ and Pregnant patients) (1 - 1-dose 75+ series) 02/19/2063 ---            Health maintenance reviewed    Follow Up:  No follow-ups on file.    Exam     Review of Systems:  (as noted above)  Review of Systems   Constitutional:  Negative for activity change, fatigue, fever and unexpected weight change.   HENT:  Positive for congestion and rhinorrhea. Negative for hearing loss, sore throat and trouble swallowing.    Eyes:  Negative for discharge and visual disturbance.   Respiratory:  Positive for cough. Negative for chest tightness, shortness of breath and wheezing.    Cardiovascular:  Positive for palpitations. Negative for chest pain.   Gastrointestinal:  Positive for blood in stool. Negative for constipation, diarrhea and vomiting.   Endocrine: Negative for polydipsia and polyuria.   Genitourinary:  Negative for difficulty urinating, hematuria and urgency.   Musculoskeletal:  Positive for arthralgias and joint swelling. Negative for neck pain.   Neurological:  Negative for weakness and  "headaches.   Psychiatric/Behavioral:  Negative for confusion and dysphoric mood.        Physical Exam:   Physical Exam  Constitutional:       General: He is not in acute distress.     Appearance: Normal appearance. He is not ill-appearing.   HENT:      Head: Normocephalic and atraumatic.      Right Ear: Tympanic membrane normal.      Left Ear: Tympanic membrane normal.      Nose: Rhinorrhea present.      Mouth/Throat:      Mouth: Mucous membranes are moist.      Pharynx: Oropharyngeal exudate (PND) and posterior oropharyngeal erythema present.   Eyes:      Conjunctiva/sclera: Conjunctivae normal.      Pupils: Pupils are equal, round, and reactive to light.   Cardiovascular:      Rate and Rhythm: Normal rate and regular rhythm.      Pulses: Normal pulses.      Heart sounds: Normal heart sounds. No murmur heard.  Pulmonary:      Effort: Pulmonary effort is normal. No respiratory distress.      Breath sounds: Normal breath sounds. No wheezing.   Abdominal:      General: Abdomen is flat. Bowel sounds are normal. There is no distension.      Palpations: Abdomen is soft.      Tenderness: There is no abdominal tenderness.   Musculoskeletal:         General: Normal range of motion.      Cervical back: Normal range of motion and neck supple. No rigidity.   Lymphadenopathy:      Cervical: No cervical adenopathy.   Skin:     General: Skin is warm and dry.      Capillary Refill: Capillary refill takes less than 2 seconds.      Findings: No erythema or rash.   Neurological:      Mental Status: He is alert and oriented to person, place, and time.   Psychiatric:         Mood and Affect: Mood normal.         Behavior: Behavior normal.       Vitals:    11/04/24 1125   BP: 114/72   BP Location: Right arm   Patient Position: Sitting   Pulse: 71   Temp: 98.6 °F (37 °C)   TempSrc: Oral   SpO2: 95%   Weight: 79.7 kg (175 lb 11.3 oz)   Height: 6' 4" (1.93 m)      Body mass index is 21.39 kg/m².        History     Past Medical " History:  History reviewed. No pertinent past medical history.    Past Surgical History:  Past Surgical History:   Procedure Laterality Date    KNEE SURGERY Right     testicle surgery for torsion         Social History:  Social History     Socioeconomic History    Marital status: Single   Occupational History    Occupation: Coffee distributor to [employer]     Employer: [employer]   Tobacco Use    Smoking status: Never    Smokeless tobacco: Never   Substance and Sexual Activity    Alcohol use: No    Drug use: Yes     Types: Marijuana    Sexual activity: Yes   Social History Narrative    3 children     Social Drivers of Health     Financial Resource Strain: High Risk (10/28/2024)    Overall Financial Resource Strain (CARDIA)     Difficulty of Paying Living Expenses: Hard   Food Insecurity: Food Insecurity Present (10/28/2024)    Hunger Vital Sign     Worried About Running Out of Food in the Last Year: Often true     Ran Out of Food in the Last Year: Sometimes true   Physical Activity: Unknown (10/28/2024)    Exercise Vital Sign     Days of Exercise per Week: Patient declined     Minutes of Exercise per Session: 0 min   Stress: No Stress Concern Present (10/28/2024)    [employer] [place] [employer] - Occupational Stress Questionnaire     Feeling of Stress : Not at all   Housing Stability: Unknown (10/28/2024)    Housing Stability Vital Sign     Unable to Pay for Housing in the Last Year: No       Family History:  Family History   Problem Relation Name Age of Onset    Asthma Mother      Diabetes Father      No Known Problems Sister      No Known Problems Brother      No Known Problems Brother      No Known Problems Brother      No Known Problems Brother      No Known Problems Son      No Known Problems Son      Allergic rhinitis Daughter         Allergies and Medications: (updated and reviewed)  Review of patient's allergies indicates:  No Known Allergies  Current Outpatient Medications   Medication Sig  Dispense Refill    benzonatate (TESSALON) 100 MG capsule Take 1 capsule (100 mg total) by mouth 3 (three) times daily as needed for Cough. 30 capsule 0    cetirizine (ZYRTEC) 10 MG tablet Take 1 tablet (10 mg total) by mouth once daily. 30 tablet 0    diphenhydrAMINE (BENADRYL) 25 mg capsule Take 1 capsule (25 mg total) by mouth every 6 (six) hours as needed for Itching or Allergies. 20 capsule 0    docusate sodium (COLACE) 100 MG capsule Take 1 capsule (100 mg total) by mouth once daily. 30 capsule 3    famotidine (PEPCID) 20 MG tablet Take 1 tablet (20 mg total) by mouth 2 (two) times daily. 20 tablet 0    fluticasone propionate (FLONASE) 50 mcg/actuation nasal spray 1 spray (50 mcg total) by Each Nostril route 2 (two) times daily as needed for Rhinitis. 15 g 0    hydrocortisone (ANUSOL-HC) 2.5 % rectal cream Place rectally 2 (two) times daily as needed for Hemorrhoids. 28 g 1    ibuprofen (ADVIL,MOTRIN) 600 MG tablet Take 1 tablet (600 mg total) by mouth every 6 (six) hours as needed for Pain (Take with food as needed for mild-to-moderate pain). 20 tablet 0    NIFEdipine (PROCARDIA-XL) 60 MG (OSM) 24 hr tablet Take 1 tablet (60 mg total) by mouth once daily. 30 tablet 5    polyethylene glycol (GLYCOLAX) 17 gram/dose powder Take 17 g by mouth once daily. for 17 days 289 g 0    predniSONE (DELTASONE) 20 MG tablet Take 2 tablets (40 mg total) by mouth once daily. for 4 days 8 tablet 0     No current facility-administered medications for this visit.       Patient Care Team:  Mauro Esquivel MD as PCP - General (Internal Medicine)         - The patient is given an After Visit Summary that lists all medications with directions, allergies, education, orders placed during this encounter and follow-up instructions.      - I have reviewed the patient's medical information including past medical, family, and social history sections including the medications and allergies.      - We discussed the patient's current  medications.     This note was created by combination of typed  and MModal dictation.  Transcription errors may be present.  If there are any questions, please contact me.                 FLORENCIO Soto

## 2024-11-04 NOTE — Clinical Note
Esteban Esquivel, I see he has an annual with you next week. Just wanted to send you his chart so you can see what I saw him for. Feel free to give me any feedback you have about my assessment/charting/decision making! Thanks, Estehr

## 2024-11-13 NOTE — PROGRESS NOTES
This note was created by combination of typed  and M-Modal dictation.  Transcription errors may be present.   This note was also generated with the assistance of ambient listening technology. Verbal consent was obtained by the patient and accompanying visitor(s) for the recording of patient appointment to facilitate this note. I attest to having reviewed and edited the generated note for accuracy, though some syntax or spelling errors may persist. Please contact the author of this note for any clarification.    Assessment and Plan:   Assessment and Plan   Idiopathic urticaria  -gets urticaria and episode of lip swelling, for the past month or so.  I suspect this maybe sequelae of COVID infection which he had in late July/early August, 1st time ever to his knowledge.  Not related in time course to food intake, medications.  Not related to known environmental exposure.  Has a occurred at work and also at home.    Discuss the pathophysiology of idiopathic urticaria.  Mainstay of treatment is supportive care with antihistamines and he can take 2 pills twice a day.  I will send in fexofenadine as it is the least sedating.  He also finds Benadryl effective and if Allegra not as effective he can use Benadryl instead.  Will update a TSH with his next labs  -     TSH; Future; Expected date: 11/14/2024  -     fexofenadine (ALLEGRA) 180 MG tablet; Take 2 tablets (360 mg total) by mouth 2 (two) times daily.  Dispense: 120 tablet; Refill: 2    Routine screening for STI (sexually transmitted infection)  -reports that his partner of 16 years recently was diagnosed with a UTI and she apparently alluded to some other possible infection?  It is unclear of the details.  He was not aware of it.  He has got no symptoms.  But he would like testing.    Check HIV, syphilis, chlamydia, gonorrhea.  Hep B immune  -     C. trachomatis/N. gonorrhoeae by AMP DNA; Future; Expected date: 11/14/2024  -     HIV 1/2 Ag/Ab (4th Gen); Future;  Expected date: 11/14/2024  -     Treponema Pallidium Antibodies IgG, IgM; Future; Expected date: 11/14/2024    Raynaud's phenomenon without gangrene  -refilled nifedipine.  Notes middling efficacy denies side effects  -     NIFEdipine (PROCARDIA-XL) 60 MG (OSM) 24 hr tablet; Take 1 tablet (60 mg total) by mouth once daily.  Dispense: 90 tablet; Refill: 3    Blood in stool  -painless blood in stool in the toilet water and on wiping.  Will check a colonoscopy.  -     Ambulatory referral/consult to Endo Procedure ; Future; Expected date: 11/15/2024          Medications Discontinued During This Encounter   Medication Reason    predniSONE (DELTASONE) 20 MG tablet     benzonatate (TESSALON) 100 MG capsule     NIFEdipine (PROCARDIA-XL) 60 MG (OSM) 24 hr tablet Reorder    cetirizine (ZYRTEC) 10 MG tablet Alternate therapy       meds sent this encounter:     Medications Ordered This Encounter   Medications    fexofenadine (ALLEGRA) 180 MG tablet     Sig: Take 2 tablets (360 mg total) by mouth 2 (two) times daily.     Dispense:  120 tablet     Refill:  2    NIFEdipine (PROCARDIA-XL) 60 MG (OSM) 24 hr tablet     Sig: Take 1 tablet (60 mg total) by mouth once daily.     Dispense:  90 tablet     Refill:  3     .        Follow Up:   Future Appointments   Date Time Provider Department Center   11/14/2024  8:40 AM Mauro Esquivel MD [facility] FAM MED [place]   1/27/2025  1:45 PM Tara Serrano MD [facility] DERM [address]   3/27/2025  9:00 AM Mario Michael MD [facility] ALLERGY [place]          Subjective:   Subjective   Chief Complaint   Patient presents with    Rash     Right arm and bottom of  both foot.       KATHRINE Toscano is a 34 y.o. male.     Social History     Tobacco Use    Smoking status: Never    Smokeless tobacco: Never   Substance Use Topics    Alcohol use: No      Social History     Occupational History    Occupation: Coffee distributor to [employer]     Employer: [employer]      Social History     Social History Narrative     3 children       Patient Care Team:  Mauro Esquivel MD as PCP - General (Internal Medicine)    Last appointment with this clinic was 11/4/2024. Last visit with me 9/14/2023   To summarize last visit and events leading up to today:  Raynaud's phenomenon, trial of nifedipine 02/2023.  Increase dose.  If still ineffective consider tadalafil  Chronic right knee pain.  Meniscal pathology?  Referred for physical therapy.  Topical diclofenac.  XR  No fracture. Question mild narrowing medial compartment, preserved lateral and patellofemoral compartments and no periarticular spurring. No suprapatellar bursal effusion or prepatellar soft tissue swelling.  Eczema topical steroid  09/03/2023 ED for perianal abscess, status post incision and drainage, Bactrim  CT  1. Right paramedian gluteal fold abscess, just posterior to the anus. Correlation is advised. Prominent right inguinal lymph nodes, likely reactive to the same. Follow-up recommended.  2. Hepatomegaly noting possible hepatic steatosis, correlation with LFTs recommended.  3. Moderate stool within the colon, developing constipation is a consideration.  4. Please see above for additional findings.    7/2024 ED with covid    10/7/24 ED for urticaria. Hives to legs x 2 weeks.     10/30/24 ED with R upper lip swelling.  Felt not to be angioedema.     11/4/24 saw NP for follow up. Referred to derm. Pepcid, zyrtec, flonase.   BRBPR with history of rectal abscess. Colace, miralax  Cough x 2 weeks, CXR ordered    CXR scheduled not done    Today's visit:    History of Present Illness      HPI:  Rosemary reports episodes of swelling and itching in various parts of the body for almost a month. The symptoms initially manifested at work with a sensation of pressure under the feet, progressing to affect the hands, face, and eyes. Rosemary describes the symptoms as progressive and concerning.    The swelling and itching occur daily with fluctuating episodes. Rosemary has pain in the  heels when walking. Itching precedes swelling in affected areas. Symptoms can occur both at work and at home, ruling out a specific environmental trigger.    Rosemary has visited the emergency room twice for this condition. He was prescribed prednisone and Benadryl, which have provided some relief. Rosemary also uses ibuprofen for pain as needed.    Rosemary had COVID-19 infection at the end of July or early August, which was his first time ceasar the virus.     Rosemary reports rectal bleeding when defecating, starting about 3 weeks ago. This occurs intermittently with no associated pain or visible lumps. Rosemary had a boil in the rectal area a few months ago, which was treated at the hospital.  At that time he was symptomatic with significant pain in the anus.  With this, the bleeding is painless.  No palpable masses in his rectum that he can tell.    Rosemary has ongoing issues with his fingers, possibly related to Raynaud's phenomenon, for which he was previously prescribed nifedipine. He reports losing the medication and needing a refill.  Notes that the efficacy is only so-so and cold exposure will trigger    Rosemary denies problems with breathing, fevers, chills, headaches, chest pain, coughing, abdominal pain, upset stomach, or diarrhea. Rosemary also denies any current cough or cold symptoms.    Requesting STI testing.  He is in a monogamous relationship for the past 16 years but she was recently treated for UTI and reportedly told him she had some some sort of infection?  It is unclear whether she was alluding to the UTI or if there was some other infection.  He was not given any specific details    MEDICATIONS:  - Prednisone, prescribed at emergency room  - Benadryl, as needed for itching and swelling  - Ibuprofen, as needed for pain  - Nifedipine, for finger symptoms (Raynaud's phenomenon)    MEDICAL HISTORY:  - COVID-19 infection: July/August (first time)      ROS:  General: no fever, no  chills  Cardiovascular: no chest pain  Respiratory: no cough  Gastrointestinal: no abdominal pain, no diarrhea, reports rectal bleeding  Musculoskeletal: reports joint pain, no joint swelling  Skin: reports itching  Neurological: no headache         Answers submitted by the patient for this visit:  Review of Systems Questionnaire (Submitted on 11/7/2024)  activity change: No  unexpected weight change: No  neck pain: No  hearing loss: No  rhinorrhea: No  trouble swallowing: No  eye discharge: No  visual disturbance: No  chest tightness: No  wheezing: No  chest pain: No  palpitations: No  blood in stool: No  constipation: No  vomiting: No  diarrhea: No  polydipsia: No  polyuria: No  difficulty urinating: No  urgency: No  hematuria: No  joint swelling: No  arthralgias: No  headaches: No  weakness: No  confusion: No  dysphoric mood: No    ALLERGIES AND MEDICATIONS: updated and reviewed.  Medication List with Changes/Refills   Current Medications    BENZONATATE (TESSALON) 100 MG CAPSULE    Take 1 capsule (100 mg total) by mouth 3 (three) times daily as needed for Cough.    CETIRIZINE (ZYRTEC) 10 MG TABLET    Take 1 tablet (10 mg total) by mouth once daily.    DIPHENHYDRAMINE (BENADRYL) 25 MG CAPSULE    Take 1 capsule (25 mg total) by mouth every 6 (six) hours as needed for Itching or Allergies.    DOCUSATE SODIUM (COLACE) 100 MG CAPSULE    Take 1 capsule (100 mg total) by mouth once daily.    FAMOTIDINE (PEPCID) 20 MG TABLET    Take 1 tablet (20 mg total) by mouth 2 (two) times daily.    FLUTICASONE PROPIONATE (FLONASE) 50 MCG/ACTUATION NASAL SPRAY    1 spray (50 mcg total) by Each Nostril route 2 (two) times daily as needed for Rhinitis.    HYDROCORTISONE (ANUSOL-HC) 2.5 % RECTAL CREAM    Place rectally 2 (two) times daily as needed for Hemorrhoids.    IBUPROFEN (ADVIL,MOTRIN) 600 MG TABLET    Take 1 tablet (600 mg total) by mouth every 6 (six) hours as needed for Pain (Take with food as needed for mild-to-moderate  "pain).    NIFEDIPINE (PROCARDIA-XL) 60 MG (OSM) 24 HR TABLET    Take 1 tablet (60 mg total) by mouth once daily.    POLYETHYLENE GLYCOL (GLYCOLAX) 17 GRAM/DOSE POWDER    Take 17 g by mouth once daily. for 17 days         Objective:   Objective   Physical Exam   Vitals:    11/14/24 0829   BP: 120/62   Pulse: 70   Temp: 98 °F (36.7 °C)   TempSrc: Oral   SpO2: 99%   Weight: 77.7 kg (171 lb 3 oz)   Height: 6' 4" (1.93 m)    Body mass index is 20.84 kg/m².            Physical Exam  Constitutional:       Appearance: Normal appearance. He is well-developed.   HENT:      Right Ear: Tympanic membrane and external ear normal.      Left Ear: Tympanic membrane and external ear normal.      Nose: Nose normal.   Eyes:      General: No scleral icterus.     Conjunctiva/sclera: Conjunctivae normal.   Neck:      Thyroid: No thyroid mass or thyromegaly.      Trachea: Trachea normal.   Cardiovascular:      Rate and Rhythm: Normal rate and regular rhythm.      Heart sounds: Normal heart sounds, S1 normal and S2 normal. No murmur heard.  Pulmonary:      Effort: Pulmonary effort is normal.      Breath sounds: Normal breath sounds.   Abdominal:      General: There is no distension.      Palpations: Abdomen is soft. There is no hepatomegaly, splenomegaly or mass.      Tenderness: There is no abdominal tenderness.   Musculoskeletal:         General: No deformity.      Right lower leg: No edema.      Left lower leg: No edema.   Lymphadenopathy:      Cervical: No cervical adenopathy.   Skin:     General: Skin is warm and dry.      Findings: Rash (on exposed skin) present.      Comments: Patch of raised welts on his right flexor forearm   Neurological:      Mental Status: He is alert and oriented to person, place, and time.      Cranial Nerves: No cranial nerve deficit.      Sensory: No sensory deficit.      Deep Tendon Reflexes: Reflexes are normal and symmetric.   Psychiatric:         Speech: Speech normal.         Behavior: Behavior normal.  "        Thought Content: Thought content normal.         Judgment: Judgment normal.

## 2024-11-14 ENCOUNTER — TELEPHONE (OUTPATIENT)
Dept: ALLERGY | Facility: CLINIC | Age: 34
End: 2024-11-14
Payer: COMMERCIAL

## 2024-11-14 ENCOUNTER — OFFICE VISIT (OUTPATIENT)
Dept: FAMILY MEDICINE | Facility: CLINIC | Age: 34
End: 2024-11-14
Payer: COMMERCIAL

## 2024-11-14 ENCOUNTER — LAB VISIT (OUTPATIENT)
Dept: LAB | Facility: HOSPITAL | Age: 34
End: 2024-11-14
Attending: INTERNAL MEDICINE
Payer: COMMERCIAL

## 2024-11-14 VITALS
SYSTOLIC BLOOD PRESSURE: 120 MMHG | HEART RATE: 70 BPM | HEIGHT: 76 IN | OXYGEN SATURATION: 99 % | WEIGHT: 171.19 LBS | TEMPERATURE: 98 F | BODY MASS INDEX: 20.85 KG/M2 | DIASTOLIC BLOOD PRESSURE: 62 MMHG

## 2024-11-14 DIAGNOSIS — Z11.3 ROUTINE SCREENING FOR STI (SEXUALLY TRANSMITTED INFECTION): ICD-10-CM

## 2024-11-14 DIAGNOSIS — Z00.00 NORMAL PHYSICAL EXAM: ICD-10-CM

## 2024-11-14 DIAGNOSIS — K92.1 BLOOD IN STOOL: ICD-10-CM

## 2024-11-14 DIAGNOSIS — L50.1 IDIOPATHIC URTICARIA: Primary | ICD-10-CM

## 2024-11-14 DIAGNOSIS — I73.00 RAYNAUD'S PHENOMENON WITHOUT GANGRENE: ICD-10-CM

## 2024-11-14 DIAGNOSIS — L50.1 IDIOPATHIC URTICARIA: ICD-10-CM

## 2024-11-14 LAB
ALBUMIN SERPL BCP-MCNC: 3.9 G/DL (ref 3.5–5.2)
ALP SERPL-CCNC: 68 U/L (ref 40–150)
ALT SERPL W/O P-5'-P-CCNC: 30 U/L (ref 10–44)
ANION GAP SERPL CALC-SCNC: 7 MMOL/L (ref 8–16)
AST SERPL-CCNC: 31 U/L (ref 10–40)
BILIRUB SERPL-MCNC: 0.5 MG/DL (ref 0.1–1)
BUN SERPL-MCNC: 13 MG/DL (ref 6–20)
CALCIUM SERPL-MCNC: 9.4 MG/DL (ref 8.7–10.5)
CHLORIDE SERPL-SCNC: 104 MMOL/L (ref 95–110)
CHOLEST SERPL-MCNC: 191 MG/DL (ref 120–199)
CHOLEST/HDLC SERPL: 3.5 {RATIO} (ref 2–5)
CO2 SERPL-SCNC: 27 MMOL/L (ref 23–29)
CREAT SERPL-MCNC: 1.3 MG/DL (ref 0.5–1.4)
ERYTHROCYTE [DISTWIDTH] IN BLOOD BY AUTOMATED COUNT: 13.6 % (ref 11.5–14.5)
EST. GFR  (NO RACE VARIABLE): >60 ML/MIN/1.73 M^2
GLUCOSE SERPL-MCNC: 62 MG/DL (ref 70–110)
HCT VFR BLD AUTO: 42 % (ref 40–54)
HDLC SERPL-MCNC: 55 MG/DL (ref 40–75)
HDLC SERPL: 28.8 % (ref 20–50)
HGB BLD-MCNC: 13.8 G/DL (ref 14–18)
HIV 1+2 AB+HIV1 P24 AG SERPL QL IA: NORMAL
LDLC SERPL CALC-MCNC: 124.4 MG/DL (ref 63–159)
MCH RBC QN AUTO: 29.2 PG (ref 27–31)
MCHC RBC AUTO-ENTMCNC: 32.9 G/DL (ref 32–36)
MCV RBC AUTO: 89 FL (ref 82–98)
NONHDLC SERPL-MCNC: 136 MG/DL
PLATELET # BLD AUTO: 246 K/UL (ref 150–450)
PMV BLD AUTO: 10.3 FL (ref 9.2–12.9)
POTASSIUM SERPL-SCNC: 4.5 MMOL/L (ref 3.5–5.1)
PROT SERPL-MCNC: 6.8 G/DL (ref 6–8.4)
RBC # BLD AUTO: 4.73 M/UL (ref 4.6–6.2)
SODIUM SERPL-SCNC: 138 MMOL/L (ref 136–145)
TREPONEMA PALLIDUM IGG+IGM AB [PRESENCE] IN SERUM OR PLASMA BY IMMUNOASSAY: NONREACTIVE
TRIGL SERPL-MCNC: 58 MG/DL (ref 30–150)
TSH SERPL DL<=0.005 MIU/L-ACNC: 1.14 UIU/ML (ref 0.4–4)
WBC # BLD AUTO: 4.34 K/UL (ref 3.9–12.7)

## 2024-11-14 PROCEDURE — 86593 SYPHILIS TEST NON-TREP QUANT: CPT | Performed by: INTERNAL MEDICINE

## 2024-11-14 PROCEDURE — 99214 OFFICE O/P EST MOD 30 MIN: CPT | Mod: S$GLB,,, | Performed by: INTERNAL MEDICINE

## 2024-11-14 PROCEDURE — 87389 HIV-1 AG W/HIV-1&-2 AB AG IA: CPT | Performed by: INTERNAL MEDICINE

## 2024-11-14 PROCEDURE — 84443 ASSAY THYROID STIM HORMONE: CPT | Performed by: INTERNAL MEDICINE

## 2024-11-14 PROCEDURE — 80053 COMPREHEN METABOLIC PANEL: CPT | Performed by: INTERNAL MEDICINE

## 2024-11-14 PROCEDURE — 36415 COLL VENOUS BLD VENIPUNCTURE: CPT | Mod: PO | Performed by: INTERNAL MEDICINE

## 2024-11-14 PROCEDURE — 99999 PR PBB SHADOW E&M-EST. PATIENT-LVL IV: CPT | Mod: PBBFAC,,, | Performed by: INTERNAL MEDICINE

## 2024-11-14 PROCEDURE — 80061 LIPID PANEL: CPT | Performed by: INTERNAL MEDICINE

## 2024-11-14 PROCEDURE — 85027 COMPLETE CBC AUTOMATED: CPT | Performed by: INTERNAL MEDICINE

## 2024-11-14 RX ORDER — MINERAL OIL
360 ENEMA (ML) RECTAL 2 TIMES DAILY
Qty: 120 TABLET | Refills: 2 | Status: SHIPPED | OUTPATIENT
Start: 2024-11-14 | End: 2025-11-14

## 2024-11-14 RX ORDER — NIFEDIPINE 60 MG/1
60 TABLET, EXTENDED RELEASE ORAL DAILY
Qty: 90 TABLET | Refills: 3 | Status: SHIPPED | OUTPATIENT
Start: 2024-11-14 | End: 2025-11-14

## 2024-11-14 NOTE — LETTER
November 14, 2024      [facility]  [address] [facility]  [place] [address]  Phone: [phone]  Fax: [phone]       Patient: Rosemary Aleman  YOB: 1990  Date of Visit: 11/14/24      To Whom It May Concern:    Rosemary Aleman  was at [facility] on 11/14/24.  If you have any questions or concerns, or if I can be of further assistance, please do not hesitate to contact me.      Sincerely,        Mauro Esquivel MD

## 2024-11-15 NOTE — PROGRESS NOTES
CBC normal/stable   CMP normal   Lipid normal   TSH normal  HIV, syphilis negative   Chlamydia and gonorrhea pending    Results to patient via [email or web address]

## 2024-11-21 ENCOUNTER — OFFICE VISIT (OUTPATIENT)
Dept: ALLERGY | Facility: CLINIC | Age: 34
End: 2024-11-21
Payer: COMMERCIAL

## 2024-11-21 VITALS — WEIGHT: 173.06 LBS | BODY MASS INDEX: 21.07 KG/M2 | HEIGHT: 76 IN

## 2024-11-21 DIAGNOSIS — T78.3XXA ANGIOEDEMA, INITIAL ENCOUNTER: ICD-10-CM

## 2024-11-21 DIAGNOSIS — L50.1 CHRONIC IDIOPATHIC URTICARIA: Primary | ICD-10-CM

## 2024-11-21 DIAGNOSIS — R21 PAPULAR RASH: ICD-10-CM

## 2024-11-21 PROCEDURE — 99999 PR PBB SHADOW E&M-EST. PATIENT-LVL III: CPT | Mod: PBBFAC,,, | Performed by: STUDENT IN AN ORGANIZED HEALTH CARE EDUCATION/TRAINING PROGRAM

## 2024-11-21 RX ORDER — TRIAMCINOLONE ACETONIDE 1 MG/G
OINTMENT TOPICAL
Qty: 80 G | Refills: 5 | Status: SHIPPED | OUTPATIENT
Start: 2024-11-21

## 2024-11-21 NOTE — LETTER
November 21, 2024      [facility]  [address]  [place] [address]  Phone: [phone]       Patient: Rosemary Aleman   YOB: 1990  Date of Visit: 11/21/2024    To Whom It May Concern:    Zee Aleman  was at [facility] on 11/21/2024. The patient may return to work on 11/21/2024 with no restrictions. If you have any questions or concerns, or if I can be of further assistance, please do not hesitate to contact me.    Sincerely,    Yvette Emerson LPN
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
## 2024-11-21 NOTE — PATIENT INSTRUCTIONS
For your hives/swelling:   Your diagnosis is chronic spontaneous urticaria/angioedema. The first-line treatment for this is high dose antihistamines. I recommend using zyrtec (cetirizine, 10 mg tablets), xyzal (levocetirizine, 5 mg tablets), claritin (loratadine, 10 mg tablets), or allegra (fexofenadine, 180 mg tablets) up to 2 tablets twice daily. You can adjust to the lowest dose that controls your symptoms. Cetirizine and levocetirizine are likely the most effective, but if you find it sedating, you could try loratadine. Fexofenadine has the least potential to cause drowsiness, but might be the least effective. The generic versions of these medications can be bought in bulk for reasonable prices at [employer], [employer], [employer], or [employer].    For the rash with the smaller bumps on your abdomen, I have prescribed triamcinolone ointment. You can use it twice per day for up to 10 days at a time.

## 2025-01-24 ENCOUNTER — TELEPHONE (OUTPATIENT)
Dept: DERMATOLOGY | Facility: CLINIC | Age: 35
End: 2025-01-24
Payer: COMMERCIAL

## 2025-01-27 ENCOUNTER — TELEPHONE (OUTPATIENT)
Dept: DERMATOLOGY | Facility: CLINIC | Age: 35
End: 2025-01-27
Payer: COMMERCIAL

## 2025-02-10 ENCOUNTER — TELEPHONE (OUTPATIENT)
Dept: ENDOSCOPY | Facility: HOSPITAL | Age: 35
End: 2025-02-10

## 2025-02-28 ENCOUNTER — TELEPHONE (OUTPATIENT)
Dept: ENDOSCOPY | Facility: HOSPITAL | Age: 35
End: 2025-02-28
Payer: COMMERCIAL

## 2025-02-28 NOTE — TELEPHONE ENCOUNTER
Attempted contact to schedule colonoscopy.  No answer-left main line phone number to return call.

## 2025-03-11 ENCOUNTER — TELEPHONE (OUTPATIENT)
Dept: ENDOSCOPY | Facility: HOSPITAL | Age: 35
End: 2025-03-11
Payer: COMMERCIAL

## 2025-03-11 ENCOUNTER — PATIENT MESSAGE (OUTPATIENT)
Dept: FAMILY MEDICINE | Facility: CLINIC | Age: 35
End: 2025-03-11
Payer: COMMERCIAL

## 2025-03-11 NOTE — LETTER
March 18, 2025    Rosemary Aleman  [address] [facility] [address]             [address]  [place] [address]  Phone: [phone]  Fax: [phone] Dear Everton,     I hope you're doing well.  The GI department has been trying to reach you to schedule your colonoscopy. With blood in your stool it's important that we proceed with the colonoscopy.     Please contact the Endoscopy department to schedule your colonoscopy at [phone].     Wishing you good health,    Mauro Esquivel MD

## 2025-03-11 NOTE — TELEPHONE ENCOUNTER
Contacted the patient for the 3rd attempt to schedule a colonoscopy. The patient did not answer the call and left a voice message requesting a call back. Main line provided.                  Endoscopy Scheduling Department  [facility] [address] [place] [address]  Take the [facility] Elevators to 4th Floor Endoscopy Lab       Date: 03/11/2025        Medical Record # [id number]        Dear Mr. Riley,     An order for the following procedure(s) Colonoscopy was placed for you by   Dr. Esquivel.     Since multiple attempts have been made to get in touch with you, this is the last notification. Please call the scheduling nurse to schedule this procedure as soon as possible.       If you have already scheduled this appointment, please disregard this letter. If you would like to cancel this request, please call the number listed below.      Sincerely,           Endoscopy Scheduling Department [phone]           Comments: Office hours are Monday through Friday 8-430p.

## 2025-03-11 NOTE — TELEPHONE ENCOUNTER
Dear Dr. Esquivel,    The Endoscopy Scheduling Department has made 2+ attempts to reach the patient for scheduling their colonoscopy. All final attempts have been made for this referral, if the referring provider would like the patient to receive endoscopic care, please confirm with the patient whether they would like to proceed. If so, then submit a new referral and inform the Pt that the Endoscopy Scheduling department will be contacting them to schedule their procedure.      Thank you,    Endoscopy Scheduling Department